# Patient Record
Sex: FEMALE | Race: OTHER | HISPANIC OR LATINO | Employment: OTHER | URBAN - METROPOLITAN AREA
[De-identification: names, ages, dates, MRNs, and addresses within clinical notes are randomized per-mention and may not be internally consistent; named-entity substitution may affect disease eponyms.]

---

## 2017-11-21 ENCOUNTER — OFFICE VISIT (OUTPATIENT)
Dept: URGENT CARE | Facility: CLINIC | Age: 37
End: 2017-11-21
Payer: COMMERCIAL

## 2017-11-21 ENCOUNTER — HOSPITAL ENCOUNTER (OUTPATIENT)
Facility: MEDICAL CENTER | Age: 37
End: 2017-11-21
Attending: PHYSICIAN ASSISTANT
Payer: COMMERCIAL

## 2017-11-21 VITALS
OXYGEN SATURATION: 99 % | WEIGHT: 160 LBS | BODY MASS INDEX: 24.25 KG/M2 | HEART RATE: 78 BPM | RESPIRATION RATE: 16 BRPM | DIASTOLIC BLOOD PRESSURE: 72 MMHG | HEIGHT: 68 IN | SYSTOLIC BLOOD PRESSURE: 118 MMHG | TEMPERATURE: 97.7 F

## 2017-11-21 DIAGNOSIS — N76.0 ACUTE VAGINITIS: ICD-10-CM

## 2017-11-21 DIAGNOSIS — N76.0 ACUTE VAGINITIS: Primary | ICD-10-CM

## 2017-11-21 PROCEDURE — 87660 TRICHOMONAS VAGIN DIR PROBE: CPT

## 2017-11-21 PROCEDURE — 99214 OFFICE O/P EST MOD 30 MIN: CPT | Performed by: PHYSICIAN ASSISTANT

## 2017-11-21 PROCEDURE — 87510 GARDNER VAG DNA DIR PROBE: CPT

## 2017-11-21 PROCEDURE — 87480 CANDIDA DNA DIR PROBE: CPT

## 2017-11-21 RX ORDER — FLUCONAZOLE 150 MG/1
150 TABLET ORAL ONCE
Qty: 1 TAB | Refills: 0 | Status: SHIPPED | OUTPATIENT
Start: 2017-11-21 | End: 2017-11-21

## 2017-11-21 RX ORDER — METRONIDAZOLE 500 MG/1
500 TABLET ORAL EVERY 8 HOURS
Qty: 30 TAB | Refills: 0 | Status: SHIPPED | OUTPATIENT
Start: 2017-11-21 | End: 2017-12-01

## 2017-11-21 NOTE — PATIENT INSTRUCTIONS
Vaginitis  Vaginitis is an inflammation of the vagina. It is most often caused by a change in the normal balance of the bacteria and yeast that live in the vagina. This change in balance causes an overgrowth of certain bacteria or yeast, which causes the inflammation. There are different types of vaginitis, but the most common types are:  · Bacterial vaginosis.  · Yeast infection (candidiasis).  · Trichomoniasis vaginitis. This is a sexually transmitted infection (STI).  · Viral vaginitis.  · Atrophic vaginitis.  · Allergic vaginitis.  CAUSES   The cause depends on the type of vaginitis. Vaginitis can be caused by:  · Bacteria (bacterial vaginosis).  · Yeast (yeast infection).  · A parasite (trichomoniasis vaginitis)  · A virus (viral vaginitis).  · Low hormone levels (atrophic vaginitis). Low hormone levels can occur during pregnancy, breastfeeding, or after menopause.  · Irritants, such as bubble baths, scented tampons, and feminine sprays (allergic vaginitis).  Other factors can change the normal balance of the yeast and bacteria that live in the vagina. These include:  · Antibiotic medicines.  · Poor hygiene.  · Diaphragms, vaginal sponges, spermicides, birth control pills, and intrauterine devices (IUD).  · Sexual intercourse.  · Infection.  · Uncontrolled diabetes.  · A weakened immune system.  SYMPTOMS   Symptoms can vary depending on the cause of the vaginitis. Common symptoms include:  · Abnormal vaginal discharge.  ¨ The discharge is white, gray, or yellow with bacterial vaginosis.  ¨ The discharge is thick, white, and cheesy with a yeast infection.  ¨ The discharge is frothy and yellow or greenish with trichomoniasis.  · A bad vaginal odor.  ¨ The odor is fishy with bacterial vaginosis.  · Vaginal itching, pain, or swelling.  · Painful intercourse.  · Pain or burning when urinating.  Sometimes, there are no symptoms.  TREATMENT   Treatment will vary depending on the type of infection.   · Bacterial  vaginosis and trichomoniasis are often treated with antibiotic creams or pills.  · Yeast infections are often treated with antifungal medicines, such as vaginal creams or suppositories.  · Viral vaginitis has no cure, but symptoms can be treated with medicines that relieve discomfort. Your sexual partner should be treated as well.  · Atrophic vaginitis may be treated with an estrogen cream, pill, suppository, or vaginal ring. If vaginal dryness occurs, lubricants and moisturizing creams may help. You may be told to avoid scented soaps, sprays, or douches.  · Allergic vaginitis treatment involves quitting the use of the product that is causing the problem. Vaginal creams can be used to treat the symptoms.  HOME CARE INSTRUCTIONS   · Take all medicines as directed by your caregiver.  · Keep your genital area clean and dry. Avoid soap and only rinse the area with water.  · Avoid douching. It can remove the healthy bacteria in the vagina.  · Do not use tampons or have sexual intercourse until your vaginitis has been treated. Use sanitary pads while you have vaginitis.  · Wipe from front to back. This avoids the spread of bacteria from the rectum to the vagina.  · Let air reach your genital area.  ¨ Wear cotton underwear to decrease moisture buildup.  ¨ Avoid wearing underwear while you sleep until your vaginitis is gone.  ¨ Avoid tight pants and underwear or nylons without a cotton panel.  ¨ Take off wet clothing (especially bathing suits) as soon as possible.  · Use mild, non-scented products. Avoid using irritants, such as:  ¨ Scented feminine sprays.  ¨ Fabric softeners.  ¨ Scented detergents.  ¨ Scented tampons.  ¨ Scented soaps or bubble baths.  · Practice safe sex and use condoms. Condoms may prevent the spread of trichomoniasis and viral vaginitis.  SEEK MEDICAL CARE IF:   · You have abdominal pain.  · You have a fever or persistent symptoms for more than 2-3 days.  · You have a fever and your symptoms suddenly  get worse.     This information is not intended to replace advice given to you by your health care provider. Make sure you discuss any questions you have with your health care provider.     Document Released: 10/14/2008 Document Revised: 05/03/2016 Document Reviewed: 05/30/2013  [employer] Interactive Patient Education ©2016 [employer]

## 2017-11-21 NOTE — PROGRESS NOTES
Subjective:      Pt is a 37 y.o. female who presents with Vaginal Discharge            HPI  Pt notes 2-3 days of vaginal discharge, foul odor and vaginitis. Pt has not taken any Rx medications for this condition. Pt notes she is leaving town and would like prescriptions written for her to take. Pt states the pain is a 3/10, aching in nature and worse at night. Pt denies CP, SOB, NVD, paresthesias, headaches, dizziness, change in vision, hives, or other joint pain. The pt's medication list, problem list, and allergies have been evaluated and reviewed during today's visit.    PMH:  Negative per pt.      PSH:  Past Surgical History:   Procedure Laterality Date   • LIPOSUCTION     • MAMMOPLASTY AUGMENTATION     • NASAL RECONSTRUCTION         Fam Hx:    family history includes Diabetes in her father; Hyperlipidemia in her father and mother; Hypertension in her father and mother.  Family Status   Relation Status   • Mother Alive   • Father        Soc HX:  Social History     Social History   • Marital status:      Spouse name: N/A   • Number of children: N/A   • Years of education: N/A     Occupational History   • Not on file.     Social History Main Topics   • Smoking status: Never Smoker   • Smokeless tobacco: Never Used   • Alcohol use 0.5 oz/week     1 Glasses of wine per week   • Drug use: No   • Sexual activity: Yes     Partners: Male     Other Topics Concern   • Not on file     Social History Narrative   • No narrative on file         Medications:    Current Outpatient Prescriptions:   •  fluconazole (DIFLUCAN) 150 MG tablet, Take 1 Tab by mouth Once for 1 dose., Disp: 1 Tab, Rfl: 0  •  metronidazole (FLAGYL) 500 MG Tab, Take 1 Tab by mouth every 8 hours for 10 days., Disp: 30 Tab, Rfl: 0      Allergies:  Patient has no known allergies.    ROS  Constitutional: Negative for fever, chills and malaise/fatigue.   HENT: Negative for congestion and sore throat.    Eyes: Negative for blurred vision, double  "vision and photophobia.   Respiratory: Negative for cough and shortness of breath.    Cardiovascular: Negative for chest pain and palpitations.   Gastrointestinal: Negative for heartburn, nausea, vomiting, abdominal pain, diarrhea and constipation.   Genitourinary: Negative for dysuria and flank pain. +vaginitis  Musculoskeletal: Negative for joint pain and myalgias.   Skin: Negative for itching and rash.   Neurological: Negative for dizziness, tingling and headaches.   Endo/Heme/Allergies: Does not bruise/bleed easily.   Psychiatric/Behavioral: Negative for depression. The patient is not nervous/anxious.           Objective:     /72   Pulse 78   Temp 36.5 °C (97.7 °F)   Resp 16   Ht 1.727 m (5' 8\")   Wt 72.6 kg (160 lb)   SpO2 99%   BMI 24.33 kg/m²      Physical Exam   Abdominal: Hernia confirmed negative in the right inguinal area and confirmed negative in the left inguinal area.   Genitourinary: Rectum normal.       Pelvic exam was performed with patient supine. No labial fusion. There is tenderness on the right labia. There is no rash, lesion or injury on the right labia. There is tenderness on the left labia. There is no rash, lesion or injury on the left labia. There is erythema and tenderness in the vagina. No bleeding in the vagina. No foreign body in the vagina. No signs of injury around the vagina. Vaginal discharge found.   Lymphadenopathy:        Right: No inguinal adenopathy present.        Left: No inguinal adenopathy present.          Constitutional: PT is oriented to person, place, and time. PT appears well-developed and well-nourished. No distress.   HENT:   Head: Normocephalic and atraumatic.   Mouth/Throat: Oropharynx is clear and moist. No oropharyngeal exudate.   Eyes: Conjunctivae normal and EOM are normal. Pupils are equal, round, and reactive to light.   Neck: Normal range of motion. Neck supple. No thyromegaly present.   Cardiovascular: Normal rate, regular rhythm, normal heart " sounds and intact distal pulses.  Exam reveals no gallop and no friction rub.    No murmur heard.  Pulmonary/Chest: Effort normal and breath sounds normal. No respiratory distress. PT has no wheezes. PT has no rales. Pt exhibits no tenderness.   Abdominal: Soft. Bowel sounds are normal. PT exhibits no distension and no mass. There is no tenderness. There is no rebound and no guarding.   Musculoskeletal: Normal range of motion. PT exhibits no edema and no tenderness.   Neurological: PT is alert and oriented to person, place, and time. PT has normal reflexes. No cranial nerve deficit.   Skin: Skin is warm and dry. No rash noted. PT is not diaphoretic. No erythema.       Psychiatric: PT has a normal mood and affect. PT behavior is normal. Judgment and thought content normal.        Assessment/Plan:     1. Acute vaginitis  - fluconazole (DIFLUCAN) 150 MG tablet; Take 1 Tab by mouth Once for 1 dose.  Dispense: 1 Tab; Refill: 0  - metronidazole (FLAGYL) 500 MG Tab; Take 1 Tab by mouth every 8 hours for 10 days.  Dispense: 30 Tab; Refill: 0  - VAGINAL PATHOGENS DNA PANEL; Future    Rest, fluids encouraged.  AVS with medical info given.  Pt was in full understanding and agreement with the plan.  Follow-up as needed if symptoms worsen or fail to improve.

## 2017-11-22 LAB
CANDIDA DNA VAG QL PROBE+SIG AMP: NEGATIVE
G VAGINALIS DNA VAG QL PROBE+SIG AMP: POSITIVE
T VAGINALIS DNA VAG QL PROBE+SIG AMP: NEGATIVE

## 2017-11-28 ENCOUNTER — TELEPHONE (OUTPATIENT)
Dept: URGENT CARE | Facility: CLINIC | Age: 37
End: 2017-11-28

## 2017-11-28 DIAGNOSIS — N76.0 ACUTE VAGINITIS: ICD-10-CM

## 2017-11-28 DIAGNOSIS — B96.89 BV (BACTERIAL VAGINOSIS): ICD-10-CM

## 2017-11-28 DIAGNOSIS — N76.0 BV (BACTERIAL VAGINOSIS): ICD-10-CM

## 2017-11-28 RX ORDER — METRONIDAZOLE 500 MG/1
500 TABLET ORAL 2 TIMES DAILY
Qty: 14 TAB | Refills: 0 | Status: SHIPPED | OUTPATIENT
Start: 2017-11-28 | End: 2017-12-05

## 2017-11-28 NOTE — TELEPHONE ENCOUNTER
"Tried to call pt, but no answer and VM box \"not set up yet\".   Gardnerella was pos with vaginal pathogens.   She was treated with flagyl at the time of our visit.  Augusto eRdd PA-C  "

## 2017-11-29 NOTE — TELEPHONE ENCOUNTER
Patient here asking about test results- positive for BV. She lost her printed RX for Flagyl. She is requesting a new Rx.

## 2018-02-12 ENCOUNTER — OFFICE VISIT (OUTPATIENT)
Dept: INTERNAL MEDICINE | Facility: MEDICAL CENTER | Age: 38
End: 2018-02-12
Payer: COMMERCIAL

## 2018-02-12 VITALS
TEMPERATURE: 99 F | DIASTOLIC BLOOD PRESSURE: 57 MMHG | HEART RATE: 92 BPM | WEIGHT: 185 LBS | OXYGEN SATURATION: 97 % | BODY MASS INDEX: 28.04 KG/M2 | HEIGHT: 68 IN | SYSTOLIC BLOOD PRESSURE: 106 MMHG

## 2018-02-12 DIAGNOSIS — I44.0 FIRST DEGREE AV BLOCK: ICD-10-CM

## 2018-02-12 DIAGNOSIS — Z13.220 SCREENING FOR LIPID DISORDERS: ICD-10-CM

## 2018-02-12 DIAGNOSIS — Z76.89 ENCOUNTER TO ESTABLISH CARE: ICD-10-CM

## 2018-02-12 DIAGNOSIS — Z00.00 HEALTHCARE MAINTENANCE: ICD-10-CM

## 2018-02-12 PROCEDURE — 99203 OFFICE O/P NEW LOW 30 MIN: CPT | Mod: 25,GC | Performed by: INTERNAL MEDICINE

## 2018-02-12 PROCEDURE — 93000 ELECTROCARDIOGRAM COMPLETE: CPT | Performed by: INTERNAL MEDICINE

## 2018-02-12 ASSESSMENT — PATIENT HEALTH QUESTIONNAIRE - PHQ9: CLINICAL INTERPRETATION OF PHQ2 SCORE: 0

## 2018-02-13 LAB
APTT PPP: 27 SEC (ref 24–33)
INR PPP: 1 (ref 0.8–1.2)
PROTHROMBIN TIME: 10.8 SEC (ref 9.1–12)

## 2018-02-13 NOTE — PROGRESS NOTES
"New Patient to Establish    Reason to establish: New patient to establish    CC: establish care and request a preoperative labs    HPI: 38 y/o F with pertinent negative past medical history. Presents to the clinic to establish care and to request a preoperative labs.  The patient reports that she is planning to go to [place] to have a liposuction surgery. She has a list of the required preoperative list.  The patient denies fever, chills, cough, headache, chest pain, or abdominal pain.        Patient Active Problem List    Diagnosis Date Noted   • First degree AV block 10/21/2011       Past Medical History:   Diagnosis Date   • Heart block AV first degree        No current outpatient prescriptions on file.     No current facility-administered medications for this visit.        Allergies as of 02/12/2018   • (No Known Allergies)       Social History     Social History   • Marital status:      Spouse name: N/A   • Number of children: N/A   • Years of education: N/A     Occupational History   • Not on file.     Social History Main Topics   • Smoking status: Never Smoker   • Smokeless tobacco: Never Used   • Alcohol use 0.5 oz/week     1 Glasses of wine per week   • Drug use: No   • Sexual activity: Yes     Partners: Male     Other Topics Concern   • Not on file     Social History Narrative   • No narrative on file       Family History   Problem Relation Age of Onset   • Hypertension Mother    • Hyperlipidemia Mother    • Hypertension Father    • Diabetes Father    • Hyperlipidemia Father        Past Surgical History:   Procedure Laterality Date   • LIPOSUCTION     • MAMMOPLASTY AUGMENTATION     • NASAL RECONSTRUCTION         ROS: As per HPI. Additional pertinent symptoms as noted below.    All others negative    /57   Pulse 92   Temp 37.2 °C (99 °F)   Ht 1.727 m (5' 8\")   Wt 83.9 kg (185 lb)   SpO2 97%   BMI 28.13 kg/m²     Physical Exam  General:  Alert and oriented, No apparent distress.    Eyes: " Pupils equal and reactive. No scleral icterus.    Throat: Clear no erythema or exudates noted.    Neck: Supple. No lymphadenopathy noted. Thyroid not enlarged.    Lungs: Clear to auscultation and percussion bilaterally.    Cardiovascular: Regular rate and rhythm. No murmurs, rubs or gallops.    Abdomen:  Benign. No rebound or guarding noted.    Extremities: No clubbing, cyanosis, edema.    Skin: Clear. No rash or suspicious skin lesions noted.    Other:     Note: I have reviewed all pertinent labs and diagnostic tests associated with this visit with specific comments listed under the assessment and plan below    Assessment and Plan    1. Healthcare maintenance  Flu vaccine (Up to date, 2017)  Pap smear (Due, referral to women's [facility] with Dr. Gutierrez)  Colonoscopy (not indicated)  Mammography (not indicated)  Pneumonia vaccine (not indicated)  Shingles vaccine (not indicated)      2. Screening for lipid disorders  - Family H/O dyslipidemia (father and mother)  - First degree heart block  Plan  Lipid panel    3. First degree AV block  - Not sure about the cause   - Detected by doing preoperative EKG  - Not taking medications (Ca Channel blocker, BB, Digitalis), no chest pain, no h/o MI, myocarditis.  - Pt is medically stable, vitals are wnl.    4. Encounter to establish care  - Pt moved recently to [place] 4 months ago  - Pt wants to establish with a PCP      Followup: Return in about 6 months (around 8/12/2018) for please make an appointment with women's health, Dr. Gutierrez.    Risk Assessment (discuss potential complications a function of chronic problems): risk assessment has been discussed with the patient.    Complexity (discuss number of co-morbidities): co-morbidites have been discussed with the patient.    Signed by: Ana Frost M.D.

## 2018-02-15 ENCOUNTER — OFFICE VISIT (OUTPATIENT)
Dept: INTERNAL MEDICINE | Facility: MEDICAL CENTER | Age: 38
End: 2018-02-15
Payer: COMMERCIAL

## 2018-02-15 VITALS
DIASTOLIC BLOOD PRESSURE: 71 MMHG | WEIGHT: 187.2 LBS | BODY MASS INDEX: 28.37 KG/M2 | HEART RATE: 75 BPM | SYSTOLIC BLOOD PRESSURE: 106 MMHG | TEMPERATURE: 98.8 F | HEIGHT: 68 IN | OXYGEN SATURATION: 95 %

## 2018-02-15 DIAGNOSIS — Z09 FOLLOW UP: ICD-10-CM

## 2018-02-15 DIAGNOSIS — I44.0 FIRST DEGREE AV BLOCK: ICD-10-CM

## 2018-02-15 PROCEDURE — 99213 OFFICE O/P EST LOW 20 MIN: CPT | Mod: GE | Performed by: INTERNAL MEDICINE

## 2018-02-15 NOTE — PATIENT INSTRUCTIONS
First-Degree Atrioventricular Block  First-degree atrioventricular (AV) block is a type of heart block.  About Heart Block  Heart block is a problem with the system that controls how often the heart beats (heart rate). If you have heart block, the electrical signals that regulate your heart rate are slowed or interrupted.  Normal Heart Action  The heart has two upper chambers (atria) and two lower chambers (ventricles). They work together to pump blood to the body. The heartbeat starts in an upper area of the right atrium (sinoatrial node, or SA node). This is the heart's natural pacemaker. The SA node sends electrical signals that pass through another node (atrioventricular node, or AV node), which is located between the atria and ventricles. Next, the signals travel through the ventricles on conduction pathways. As the signals pass down these pathways, the ventricles contract and send blood out to the body.  About First-Degree AV Block  First-degree heart block is the least serious type of AV block. If you have first-degree AV block, the signals travel more slowly through your heart. This can cause your heart to beat more slowly than normal, but your heart does not miss any beats. Treatment is usually not needed, but the condition may increase your risk of developing an irregular heart rhythm (atrial fibrillation).  CAUSES   In some cases, a person is born with heart block. More often, the condition develops over time. First-degree heart block may be caused by:  · Any condition that damages the heart's conducting system.  · Overstimulation of a nerve that slows down the heart (vagus nerve). This is common in well-conditioned athletes.  · Some medicines that slow down the heart rate.  RISK FACTORS  The risk for this condition increases with age. It is also more likely to develop in people who have any of the following conditions:  · A heart attack in the past.  · Heart failure.  · Coronary heart  disease.  · Inflammation of heart muscle (myocarditis).  · Disease of heart muscle (cardiomyopathy).  · Infection of the heart valves (endocarditis).  · Infections or diseases that affect the heart. These include:  ¨ Lyme disease.  ¨ Sarcoidosis.  ¨ Hemochromatosis.  ¨ Rheumatic fever.  SYMPTOMS  This condition usually does not cause any symptoms.  DIAGNOSIS  This condition may be diagnosed during a routine physical exam. Your health care provider may suspect a heart block if you have a slow pulse or heartbeat. You may have tests to confirm the diagnosis and to rule out other conditions. These tests may include:  · An electrocardiogram (ECG) to check for problems with the electrical activity in your heart.  · Wearing a portable ECG device for a few days (Holter monitor) or a few weeks (event monitor). This is done to monitor your heart rate over time.  TREATMENT  Usually, treatment is not needed for this condition. You may get treatment for another condition that is causing heart block. You may also need to change or stop taking any heart medicines that could cause heart block.  HOME CARE INSTRUCTIONS  · Take over-the-counter and prescription medicines only as told by your health care provider.  · Work with your health care providers to control all of your risks for heart disease. This may include following these instructions:  ¨ Get regular exercise. Ask your health care provider what type of exercise is safe for you.  ¨ Eat a heart-healthy diet. Your health care provider or dietitian can help you make healthy choices.  ¨ Maintain a healthy weight.  ¨ Do not use any tobacco products, including cigarettes, chewing tobacco, or e-cigarettes. If you need help quitting, ask your health care provider.  ¨ Limit alcohol intake to no more than 1 drink per day for nonpregnant women and 2 drinks per day for men. One drink equals 12 oz of beer, 5 oz of wine, or 1½ oz of hard liquor.  · Keep all follow-up visits as told by your  health care provider. This is important.  SEEK MEDICAL CARE IF:  · You feel as though your heart is skipping beats.  · You feel more tired than normal.  SEEK IMMEDIATE MEDICAL CARE IF:  · You have chest pain, especially if the pain:  ¨ Feels like crushing or pressure.  ¨ Spreads to your arms, back, neck, or jaw.  · You feel short of breath.  · You feel light-headed or weak.  · You faint.     This information is not intended to replace advice given to you by your health care provider. Make sure you discuss any questions you have with your health care provider.     Document Released: 11/30/2009 Document Revised: 05/03/2016 Document Reviewed: 11/18/2015  [employer] Interactive Patient Education ©2016 [employer]

## 2018-02-15 NOTE — PROGRESS NOTES
"      Established Patient    Daria presents today with the following:    CC: Follow up and request a medical clearance before undergoing liposuction.    HPI: Ms. Fregoso is a 38 y/o F with PMH of asymptomatic first degree heart block that she was not aware of until her last office visit when we did an EKG as a requirement before undergoing liposuction.  She presents today at the clinic to request a medical clearance before undergoing liposuction.  The patient reports that she sent her laboratory results and a copy of her EKG to the surgeon and the surgeon requested a medical clearance before doing the liposuction as the patient has first degree heart block.  The patient denies chest pain, palpitations, SOB, syncope, lightheadedness, headache, blurred vision, H/O MI/angina, or family H/O CAD/heart blocks.  The patient had a liposuction surgery on 2011 after a documented asymptomatic first degree heart block and the surgery went fine with no complications.    Patient Active Problem List    Diagnosis Date Noted   • First degree AV block 10/21/2011       No current outpatient prescriptions on file.     No current facility-administered medications for this visit.        ROS: As per HPI. Additional pertinent symptoms as noted below.    All others negative    /71   Pulse 75   Temp 37.1 °C (98.8 °F)   Ht 1.727 m (5' 8\")   Wt 84.9 kg (187 lb 3.2 oz)   SpO2 95%   BMI 28.46 kg/m²     Physical Exam   Constitutional:  oriented to person, place, and time. No distress.   Eyes: Pupils are equal, round, and reactive to light. No scleral icterus.  Neck: Neck supple. No thyromegaly present.   Cardiovascular: Normal rate, regular rhythm and normal heart sounds.  Exam reveals no gallop and no friction rub.  No murmur heard.  Pulmonary/Chest: Breath sounds normal. Chest wall is not dull to percussion.   Musculoskeletal:   no edema.   Lymphadenopathy: no cervical adenopathy  Neurological: alert and oriented to person, place, " and time.   Skin: No cyanosis. Nails show no clubbing.  Other:     Note: I have reviewed all pertinent labs and diagnostic tests associated with this visit with specific comments listed under the assessment and plan below    Assessment and Plan    1. First degree AV block  - no clear etiology, probably congenital vs acquired (less likely)  - Asymptomatic, denies chest pain, palpitations, SOB, syncope, lightheadedness, headache, blurred vision, H/O MI/angina, or family H/O CAD/heart blocks.  Plan  - provide the patient with medical clearance paper to send to the surgeon  - provide the patient with a copy of the EKG to keep with her for future reference.  - assure the patient about the good prognosis of first degree heart block  - provide the patient with a pamphlet about first degree heart block so she can get more information about this condition and be able to monitor herself for any alarming signs.    2. Follow up  - The patient is currently asymptomatic in stable medical condition  - The patient reports that she is feeling healthy and ready to do liposuction.  - Pt will do liposuction at [facility] as she will have the chance to see her family.      Followup: Return if symptoms worsen or fail to improve.      Signed by: Ana Frost M.D.

## 2018-02-15 NOTE — LETTER
February 15, 2018    To whom it may concern        Regarding Daria Fregoso,      The patient has an EKG of first degree heart block that should not increase her risk for any heart complications, she is currently asymptomatic and should be low risk  For upcoming surgery                                Ana Frost

## 2018-03-19 ENCOUNTER — APPOINTMENT (OUTPATIENT)
Dept: INTERNAL MEDICINE | Facility: MEDICAL CENTER | Age: 38
End: 2018-03-19
Payer: COMMERCIAL

## 2018-04-02 ENCOUNTER — OFFICE VISIT (OUTPATIENT)
Dept: INTERNAL MEDICINE | Facility: MEDICAL CENTER | Age: 38
End: 2018-04-02
Payer: COMMERCIAL

## 2018-04-02 VITALS
HEIGHT: 68 IN | HEART RATE: 79 BPM | DIASTOLIC BLOOD PRESSURE: 70 MMHG | WEIGHT: 179.25 LBS | BODY MASS INDEX: 27.17 KG/M2 | TEMPERATURE: 97.9 F | SYSTOLIC BLOOD PRESSURE: 108 MMHG | OXYGEN SATURATION: 98 %

## 2018-04-02 DIAGNOSIS — I44.0 FIRST DEGREE AV BLOCK: ICD-10-CM

## 2018-04-02 DIAGNOSIS — Z01.419 PAP SMEAR, LOW-RISK: ICD-10-CM

## 2018-04-02 DIAGNOSIS — Z12.4 ROUTINE CERVICAL SMEAR: ICD-10-CM

## 2018-04-02 PROCEDURE — 99000 SPECIMEN HANDLING OFFICE-LAB: CPT | Performed by: INTERNAL MEDICINE

## 2018-04-02 PROCEDURE — 99214 OFFICE O/P EST MOD 30 MIN: CPT | Performed by: INTERNAL MEDICINE

## 2018-04-02 NOTE — PROGRESS NOTES
"      Established Patient    Daria presents today with the following:    CC:   Chief Complaint   Patient presents with   • Gynecologic Exam       HPI:    37 yr old female patient with PMHx of First degree AV block presented to the clinic for routine pelvic/breast examination and pap smear.     Previous pelvic exams have all been normal.  Patient denies any current symptoms: No dyspareunia, no discharge, no lesions.  Patient denies family history of GYN or breast cancers.   Patient has had two previous pregnancies  Ab 2   Patient's menstrual history was unremarkable.  Patient is not in need of contraception at this time.  Patient denies any STD risks or concerns.   Patient denies any history of sexual abuse.   Patient denies breast masses or lesions .  Patient Has had two Mammograms in the past and was normal with no evidence of malignancy.    Patient Active Problem List    Diagnosis Date Noted   • First degree AV block 10/21/2011       No current outpatient prescriptions on file.     No current facility-administered medications for this visit.        ROS: As per HPI. Additional pertinent symptoms as noted below.    All others negative    /70   Pulse 79   Temp 36.6 °C (97.9 °F)   Ht 1.727 m (5' 8\")   Wt 81.3 kg (179 lb 4 oz)   LMP 2018   SpO2 98%   BMI 27.25 kg/m²      Physical Exam   General:   No distress.  Normal appearing.  HEENT: Pupils are equal.  Conjunctiva is normal.  Head is normal appearing.  Ears, canals and tympanic membranes are normal.  Oral cavity is pink and moist without lesion.  Neck: Supple without JVD or bruit.  Thyroid is not enlarged.  Pulmonary: Clear to auscultation, with good breath sounds.  Cardiovascular regular rate and rhythm.  No murmur auscultated. No carotid bruits.  Abdomen: Soft, nontender, nondistended.  Normal bowel sounds.  Organs are not enlarged.  Neurologic: Cranial nerves intact.  Strength and sensation are normal.  Skin: No obvious lesions.  Lymph: No " cervical, supraclavicular, axillary nodes noted.  Genitourinary: External genitalia are normal in appearance.                          Speculum exam- Genitourinary: External genitalia are normal in appearance.                                                   Vagina: Rugated, no odor.                               Cervix:  no motion tenderness, patent os without discharge, no lesions.                               Uterus: Small, mobile, midline,non-tender.                               Adnexae: No masses or tenderness bilaterally                               Pap collected.  Breast: Bilateral breasts are normal in appearance. Nipple and areola are normal in appearance. No abnormal skin texture. No masses palpated.      Note: I have reviewed all pertinent labs and diagnostic tests associated with this visit with specific comments listed under the assessment and plan below    Assessment and Plan     1. Routine gynecological examination  - patient here for routine GYN examination with prior examinations normal with no significant family history.  - normal examination of breast and pelvis  - will follow up on pap smear results.     2. Screening for cervical cancer  -Previous Pap smear was four years ago was normal.  - pelvic examination normal with pap collected, sent to lab  - will contact patient with results and send to PCP  - Discussed signs and symptoms of gynecologic cancers. Discussed current guidelines which say Pap smears and pelvic exams can be stopped at age 65. But that does not mean a cancer cannot develop after that age. And if there are any signs of a sore that doesn't heal, spotting or bleeding, or pelvic pain and bloating that is not transient, it needs to be evaluated.     3. Visit for preventive health examination  - patient is here for routine pelvic/pap and breast examination as part of preventive/wellness women's health  - Of note, her prior history and examination been normal.  - normal physical  examination today and will follow up pap smear results      Followup: Return if symptoms worsen or fail to improve, for with PCP.      Signed by: FRANKLYN Romero

## 2018-04-06 LAB
CYTOLOGIST CVX/VAG CYTO: NORMAL
DX ICD CODE: NORMAL
HPV I/H RISK 1 DNA CVX QL PROBE+SIG AMP: NEGATIVE
NOTE  190109: NORMAL
OTHER STN SPEC: NORMAL
PATH REPORT.FINAL DX SPEC: NORMAL
STAT OF ADQ CVX/VAG CYTO-IMP: NORMAL

## 2019-09-11 ENCOUNTER — OFFICE VISIT (OUTPATIENT)
Dept: URGENT CARE | Facility: CLINIC | Age: 39
End: 2019-09-11
Payer: COMMERCIAL

## 2019-09-11 ENCOUNTER — HOSPITAL ENCOUNTER (OUTPATIENT)
Facility: MEDICAL CENTER | Age: 39
End: 2019-09-11
Attending: PHYSICIAN ASSISTANT
Payer: COMMERCIAL

## 2019-09-11 ENCOUNTER — HOSPITAL ENCOUNTER (OUTPATIENT)
Dept: LAB | Facility: MEDICAL CENTER | Age: 39
End: 2019-09-11
Attending: PHYSICIAN ASSISTANT
Payer: COMMERCIAL

## 2019-09-11 VITALS
RESPIRATION RATE: 16 BRPM | BODY MASS INDEX: 29.61 KG/M2 | WEIGHT: 195.4 LBS | DIASTOLIC BLOOD PRESSURE: 82 MMHG | HEIGHT: 68 IN | TEMPERATURE: 97.5 F | SYSTOLIC BLOOD PRESSURE: 102 MMHG | HEART RATE: 65 BPM | OXYGEN SATURATION: 97 %

## 2019-09-11 DIAGNOSIS — N89.8 VAGINAL DISCHARGE: ICD-10-CM

## 2019-09-11 DIAGNOSIS — Z20.2 POSSIBLE EXPOSURE TO STD: ICD-10-CM

## 2019-09-11 LAB
APPEARANCE UR: CLEAR
BILIRUB UR STRIP-MCNC: NEGATIVE MG/DL
COLOR UR AUTO: YELLOW
GLUCOSE UR STRIP.AUTO-MCNC: NEGATIVE MG/DL
INT CON NEG: NORMAL
INT CON POS: NORMAL
KETONES UR STRIP.AUTO-MCNC: NEGATIVE MG/DL
LEUKOCYTE ESTERASE UR QL STRIP.AUTO: NEGATIVE
NITRITE UR QL STRIP.AUTO: NEGATIVE
PH UR STRIP.AUTO: 6 [PH] (ref 5–8)
POC URINE PREGNANCY TEST: NEGATIVE
PROT UR QL STRIP: NEGATIVE MG/DL
RBC UR QL AUTO: NORMAL
SP GR UR STRIP.AUTO: 1.01
TREPONEMA PALLIDUM IGG+IGM AB [PRESENCE] IN SERUM OR PLASMA BY IMMUNOASSAY: NON REACTIVE
UROBILINOGEN UR STRIP-MCNC: 0.2 MG/DL

## 2019-09-11 PROCEDURE — 87510 GARDNER VAG DNA DIR PROBE: CPT

## 2019-09-11 PROCEDURE — 36415 COLL VENOUS BLD VENIPUNCTURE: CPT

## 2019-09-11 PROCEDURE — 87491 CHLMYD TRACH DNA AMP PROBE: CPT

## 2019-09-11 PROCEDURE — 87591 N.GONORRHOEAE DNA AMP PROB: CPT

## 2019-09-11 PROCEDURE — 86780 TREPONEMA PALLIDUM: CPT

## 2019-09-11 PROCEDURE — 87480 CANDIDA DNA DIR PROBE: CPT

## 2019-09-11 PROCEDURE — 81025 URINE PREGNANCY TEST: CPT | Performed by: PHYSICIAN ASSISTANT

## 2019-09-11 PROCEDURE — 81002 URINALYSIS NONAUTO W/O SCOPE: CPT | Performed by: PHYSICIAN ASSISTANT

## 2019-09-11 PROCEDURE — 87660 TRICHOMONAS VAGIN DIR PROBE: CPT

## 2019-09-11 PROCEDURE — 99214 OFFICE O/P EST MOD 30 MIN: CPT | Performed by: PHYSICIAN ASSISTANT

## 2019-09-11 RX ORDER — FLUCONAZOLE 150 MG/1
TABLET ORAL
Qty: 2 TAB | Refills: 1 | Status: SHIPPED | OUTPATIENT
Start: 2019-09-11 | End: 2020-03-16

## 2019-09-12 LAB
C TRACH DNA SPEC QL NAA+PROBE: NEGATIVE
CANDIDA DNA VAG QL PROBE+SIG AMP: POSITIVE
G VAGINALIS DNA VAG QL PROBE+SIG AMP: NEGATIVE
N GONORRHOEA DNA SPEC QL NAA+PROBE: NEGATIVE
SPECIMEN SOURCE: NORMAL
T VAGINALIS DNA VAG QL PROBE+SIG AMP: NEGATIVE

## 2019-09-14 NOTE — PROGRESS NOTES
Chief Complaint   Patient presents with   • Vaginitis     no discharged, burning itchy, requesting for blood work and pelvic exam x 5 days       HISTORY OF PRESENT ILLNESS: Patient is a 38 y.o. female who presents today for about 5 days of vaginal discomfort. She states she has burning and itching sensation but does not describe overt discharge.  She has a slight bit of cramping but has resolved otherwise no abdominal pain. No fevers, chills.  She has been having increased urinary frequency however she feels this has been due to her increasing her fluid intake.  2 weeks ago she had a sexual encounter however condom broke and she is concerned to get tested for STD.  No vaginal sores or lesions.  No recent abx.     Patient Active Problem List    Diagnosis Date Noted   • First degree AV block 10/21/2011       Allergies:Patient has no known allergies.    Current Outpatient Medications Ordered in Epic   Medication Sig Dispense Refill   • fluconazole (DIFLUCAN) 150 MG tablet 1 tablet today.  Repeat in 72 hours 2 Tab 1     No current Epic-ordered facility-administered medications on file.        Past Medical History:   Diagnosis Date   • Heart block AV first degree        Social History     Tobacco Use   • Smoking status: Never Smoker   • Smokeless tobacco: Never Used   Substance Use Topics   • Alcohol use: Yes     Alcohol/week: 0.5 oz     Types: 1 Glasses of wine per week   • Drug use: No       Family Status   Relation Name Status   • Mo  Alive   • Fa       Family History   Problem Relation Age of Onset   • Hypertension Mother    • Hyperlipidemia Mother    • Hypertension Father    • Diabetes Father    • Hyperlipidemia Father        ROS:  Review of Systems   Constitutional: Negative for fever, chills, weight loss and malaise/fatigue.   HENT: Negative for ear pain, nosebleeds, congestion, sore throat and neck pain.    Eyes: Negative for blurred vision.   Respiratory: Negative for cough, sputum production, shortness  "of breath and wheezing.    Cardiovascular: Negative for chest pain, palpitations, orthopnea and leg swelling.   Gastrointestinal: Negative for heartburn, nausea, vomiting and abdominal pain.   Genitourinary: SEE HPI    Exam:  /82   Pulse 65   Temp 36.4 °C (97.5 °F) (Temporal)   Resp 16   Ht 1.727 m (5' 8\")   Wt 88.6 kg (195 lb 6.4 oz)   SpO2 97%   General:  Well nourished, well developed female in NAD  Eyes: PERRLA, EOM within normal limits, no conjunctival injection, no scleral icterus, visual fields and acuity grossly intact.  Neck: no masses, range of motion within normal limits, no tracheal deviation. No lymphadenopathy  Pulmonary: Normal respiratory effort, no wheezes, crackles, or rhonchi.  Cardiovascular: regular rate and rhythm without murmurs, rubs, or gallops.  Abdomen: Soft, nontender, nondistended. Normal bowel sounds. No hepatosplenomegaly or masses, or hernias. No rebound or guarding.  : Vulva on inspection: No swelling, erythema or lesions.  Speculum exam:  There is moderate amounts of thick, curd like white discharge.  Cervix appears normal and is non-friable.  No vaginal lesions.  Bimanual exam:  No CMT, adnexal/uterine masses or tenderness  Skin: No visible rashes or lesion. Warm, pink, dry.   Extremities: no clubbing, cyanosis, or edema.  Neuro: A&O x 3. Speech normal/clear.  Normal gait.         Assessment/Plan:  1. Vaginal discharge  POCT Urinalysis    POCT Pregnancy    VAGINAL PATHOGENS DNA PANEL    CHLAMYDIA/GC PCR URINE OR SWAB    fluconazole (DIFLUCAN) 150 MG tablet   2. Possible exposure to STD  RPR       -U/A grossly unremarkable, preg negative.   -exam is consistent with significant vulvovaginal candidiasis, swab taken however empiric tx will be initiated   -RPR ran, negative.  Patient will follow up for serology for HIV, HSV if she would like to pursue this with her PCP.      Supportive care, differential diagnoses, and indications for immediate follow-up discussed with " patient.   Pathogenesis of diagnosis discussed including typical length and natural progression.   Instructed to return to clinic or nearest emergency department for any change in condition, further concerns, or worsening of symptoms.  Patient states understanding of the plan of care and discharge instructions.      Kathy Maldonado P.A.-C.

## 2019-10-03 ENCOUNTER — HOSPITAL ENCOUNTER (OUTPATIENT)
Dept: RADIOLOGY | Facility: MEDICAL CENTER | Age: 39
End: 2019-10-03
Attending: STUDENT IN AN ORGANIZED HEALTH CARE EDUCATION/TRAINING PROGRAM
Payer: COMMERCIAL

## 2019-10-03 DIAGNOSIS — Z12.31 SCREENING MAMMOGRAM FOR HIGH-RISK PATIENT: ICD-10-CM

## 2019-10-07 ENCOUNTER — HOSPITAL ENCOUNTER (OUTPATIENT)
Dept: RADIOLOGY | Facility: MEDICAL CENTER | Age: 39
End: 2019-10-07

## 2019-10-07 ENCOUNTER — HOSPITAL ENCOUNTER (OUTPATIENT)
Dept: LAB | Facility: MEDICAL CENTER | Age: 39
End: 2019-10-07
Attending: STUDENT IN AN ORGANIZED HEALTH CARE EDUCATION/TRAINING PROGRAM
Payer: COMMERCIAL

## 2019-10-07 LAB
ALBUMIN SERPL BCP-MCNC: 4.4 G/DL (ref 3.2–4.9)
ALBUMIN/GLOB SERPL: 1.6 G/DL
ALP SERPL-CCNC: 47 U/L (ref 30–99)
ALT SERPL-CCNC: 14 U/L (ref 2–50)
ANION GAP SERPL CALC-SCNC: 6 MMOL/L (ref 0–11.9)
AST SERPL-CCNC: 16 U/L (ref 12–45)
BASOPHILS # BLD AUTO: 1.1 % (ref 0–1.8)
BASOPHILS # BLD: 0.08 K/UL (ref 0–0.12)
BILIRUB SERPL-MCNC: 0.7 MG/DL (ref 0.1–1.5)
BUN SERPL-MCNC: 14 MG/DL (ref 8–22)
CALCIUM SERPL-MCNC: 8.8 MG/DL (ref 8.5–10.5)
CHLORIDE SERPL-SCNC: 105 MMOL/L (ref 96–112)
CHOLEST SERPL-MCNC: 201 MG/DL (ref 100–199)
CO2 SERPL-SCNC: 26 MMOL/L (ref 20–33)
CREAT SERPL-MCNC: 0.65 MG/DL (ref 0.5–1.4)
EOSINOPHIL # BLD AUTO: 0.19 K/UL (ref 0–0.51)
EOSINOPHIL NFR BLD: 2.5 % (ref 0–6.9)
ERYTHROCYTE [DISTWIDTH] IN BLOOD BY AUTOMATED COUNT: 44.6 FL (ref 35.9–50)
GLOBULIN SER CALC-MCNC: 2.7 G/DL (ref 1.9–3.5)
GLUCOSE SERPL-MCNC: 85 MG/DL (ref 65–99)
HCT VFR BLD AUTO: 46.3 % (ref 37–47)
HDLC SERPL-MCNC: 62 MG/DL
HGB BLD-MCNC: 14.7 G/DL (ref 12–16)
IMM GRANULOCYTES # BLD AUTO: 0.02 K/UL (ref 0–0.11)
IMM GRANULOCYTES NFR BLD AUTO: 0.3 % (ref 0–0.9)
INR PPP: 1.03 (ref 0.87–1.13)
LDLC SERPL CALC-MCNC: 122 MG/DL
LYMPHOCYTES # BLD AUTO: 2.99 K/UL (ref 1–4.8)
LYMPHOCYTES NFR BLD: 39.9 % (ref 22–41)
MCH RBC QN AUTO: 29.2 PG (ref 27–33)
MCHC RBC AUTO-ENTMCNC: 31.7 G/DL (ref 33.6–35)
MCV RBC AUTO: 92 FL (ref 81.4–97.8)
MONOCYTES # BLD AUTO: 0.51 K/UL (ref 0–0.85)
MONOCYTES NFR BLD AUTO: 6.8 % (ref 0–13.4)
NEUTROPHILS # BLD AUTO: 3.71 K/UL (ref 2–7.15)
NEUTROPHILS NFR BLD: 49.4 % (ref 44–72)
NRBC # BLD AUTO: 0.02 K/UL
NRBC BLD-RTO: 0.3 /100 WBC
PLATELET # BLD AUTO: 330 K/UL (ref 164–446)
PMV BLD AUTO: 10.9 FL (ref 9–12.9)
POTASSIUM SERPL-SCNC: 4.3 MMOL/L (ref 3.6–5.5)
PROT SERPL-MCNC: 7.1 G/DL (ref 6–8.2)
PROTHROMBIN TIME: 13.8 SEC (ref 12–14.6)
RBC # BLD AUTO: 5.03 M/UL (ref 4.2–5.4)
SODIUM SERPL-SCNC: 137 MMOL/L (ref 135–145)
TRIGL SERPL-MCNC: 86 MG/DL (ref 0–149)
WBC # BLD AUTO: 7.5 K/UL (ref 4.8–10.8)

## 2019-10-07 PROCEDURE — 80053 COMPREHEN METABOLIC PANEL: CPT

## 2019-10-07 PROCEDURE — 85025 COMPLETE CBC W/AUTO DIFF WBC: CPT

## 2019-10-07 PROCEDURE — 85610 PROTHROMBIN TIME: CPT

## 2019-10-07 PROCEDURE — 36415 COLL VENOUS BLD VENIPUNCTURE: CPT

## 2019-10-07 PROCEDURE — 80061 LIPID PANEL: CPT

## 2019-10-09 ENCOUNTER — HOSPITAL ENCOUNTER (OUTPATIENT)
Dept: RADIOLOGY | Facility: MEDICAL CENTER | Age: 39
End: 2019-10-09
Attending: STUDENT IN AN ORGANIZED HEALTH CARE EDUCATION/TRAINING PROGRAM
Payer: COMMERCIAL

## 2019-10-09 DIAGNOSIS — N64.4 BREAST PAIN, LEFT: ICD-10-CM

## 2019-10-09 PROCEDURE — 76642 ULTRASOUND BREAST LIMITED: CPT | Mod: LT

## 2019-10-09 PROCEDURE — G0279 TOMOSYNTHESIS, MAMMO: HCPCS

## 2020-03-16 ENCOUNTER — OFFICE VISIT (OUTPATIENT)
Dept: URGENT CARE | Facility: CLINIC | Age: 40
End: 2020-03-16
Payer: COMMERCIAL

## 2020-03-16 ENCOUNTER — HOSPITAL ENCOUNTER (OUTPATIENT)
Facility: MEDICAL CENTER | Age: 40
End: 2020-03-16
Attending: PHYSICIAN ASSISTANT
Payer: COMMERCIAL

## 2020-03-16 VITALS
WEIGHT: 193.2 LBS | BODY MASS INDEX: 29.28 KG/M2 | DIASTOLIC BLOOD PRESSURE: 80 MMHG | TEMPERATURE: 98.4 F | SYSTOLIC BLOOD PRESSURE: 104 MMHG | RESPIRATION RATE: 16 BRPM | HEART RATE: 74 BPM | OXYGEN SATURATION: 95 % | HEIGHT: 68 IN

## 2020-03-16 DIAGNOSIS — N76.0 ACUTE VAGINITIS: Primary | ICD-10-CM

## 2020-03-16 DIAGNOSIS — N76.0 ACUTE VAGINITIS: ICD-10-CM

## 2020-03-16 LAB
APPEARANCE UR: CLEAR
BILIRUB UR STRIP-MCNC: ABNORMAL MG/DL
COLOR UR AUTO: YELLOW
GLUCOSE UR STRIP.AUTO-MCNC: ABNORMAL MG/DL
KETONES UR STRIP.AUTO-MCNC: ABNORMAL MG/DL
LEUKOCYTE ESTERASE UR QL STRIP.AUTO: ABNORMAL
NITRITE UR QL STRIP.AUTO: ABNORMAL
PH UR STRIP.AUTO: 5.5 [PH] (ref 5–8)
PROT UR QL STRIP: ABNORMAL MG/DL
RBC UR QL AUTO: ABNORMAL
SP GR UR STRIP.AUTO: 1.03
UROBILINOGEN UR STRIP-MCNC: 0.2 MG/DL

## 2020-03-16 PROCEDURE — 87491 CHLMYD TRACH DNA AMP PROBE: CPT

## 2020-03-16 PROCEDURE — 87591 N.GONORRHOEAE DNA AMP PROB: CPT

## 2020-03-16 PROCEDURE — 99214 OFFICE O/P EST MOD 30 MIN: CPT | Performed by: PHYSICIAN ASSISTANT

## 2020-03-16 PROCEDURE — 81002 URINALYSIS NONAUTO W/O SCOPE: CPT | Performed by: PHYSICIAN ASSISTANT

## 2020-03-16 RX ORDER — FLUCONAZOLE 150 MG/1
TABLET ORAL
Qty: 3 TAB | Refills: 0 | Status: SHIPPED | OUTPATIENT
Start: 2020-03-16 | End: 2020-03-17 | Stop reason: SDUPTHER

## 2020-03-16 ASSESSMENT — FIBROSIS 4 INDEX: FIB4 SCORE: 0.53

## 2020-03-17 ENCOUNTER — TELEPHONE (OUTPATIENT)
Dept: URGENT CARE | Facility: CLINIC | Age: 40
End: 2020-03-17

## 2020-03-17 PROBLEM — Z01.818 PREOPERATIVE EVALUATION TO RULE OUT SURGICAL CONTRAINDICATION: Status: ACTIVE | Noted: 2019-10-04

## 2020-03-17 PROBLEM — M25.521 ARTHRALGIA OF RIGHT ELBOW: Status: ACTIVE | Noted: 2020-01-31

## 2020-03-17 PROBLEM — E78.5 HYPERLIPIDEMIA, UNSPECIFIED: Status: ACTIVE | Noted: 2020-01-31

## 2020-03-17 PROBLEM — M25.549 ARTHRALGIA OF HAND: Status: ACTIVE | Noted: 2019-10-04

## 2020-03-17 RX ORDER — FLUCONAZOLE 150 MG/1
TABLET ORAL
Qty: 3 TAB | Refills: 0 | Status: SHIPPED | OUTPATIENT
Start: 2020-03-17 | End: 2021-06-02

## 2020-03-17 NOTE — PATIENT INSTRUCTIONS
Vaginitis  Vaginitis is an inflammation of the vagina. It can happen when the normal bacteria and yeast in the vagina grow too much. There are different types. Treatment will depend on the type you have.  Follow these instructions at home:  · Take all medicines as told by your doctor.  · Keep your vagina area clean and dry. Avoid soap. Rinse the area with water.  · Avoid washing and cleaning out the vagina (douching).  · Do not use tampons or have sex (intercourse) until your treatment is done.  · Wipe from front to back after going to the restroom.  · Wear cotton underwear.  · Avoid wearing underwear while you sleep until your vaginitis is gone.  · Avoid tight pants. Avoid underwear or nylons without a cotton panel.  · Take off wet clothing (such as a bathing suit) as soon as you can.  · Use mild, unscented products. Avoid fabric softeners and scented:  ¨ Feminine sprays.  ¨ Laundry detergents.  ¨ Tampons.  ¨ Soaps or bubble baths.  · Practice safe sex and use condoms.  Get help right away if:  · You have belly (abdominal) pain.  · You have a fever or lasting symptoms for more than 2-3 days.  · You have a fever and your symptoms suddenly get worse.  This information is not intended to replace advice given to you by your health care provider. Make sure you discuss any questions you have with your health care provider.  Document Released: 03/16/2010 Document Revised: 05/25/2017 Document Reviewed: 05/30/2013  [employer] Interactive Patient Education © 2017 [employer]

## 2020-03-17 NOTE — TELEPHONE ENCOUNTER
Medhat Worthington the patient called in saying that when she went to the [facility] pharmacy last night they did not have the prescription that you had sent in for her. She states she is now out of town and wanted to know if you could send the medication to the [facility] that I added into her chart. Please and thank you.

## 2020-03-17 NOTE — PROGRESS NOTES
"Subjective:      Pt is a 39 y.o. female who presents with Vaginal Discharge (x 3-4 days white \"oxidized\" discharge, was on Fluconazole-finished yesterday, feels like she has to pee again. pt finished her last period 2020)            HPI  This is a new problem. x 3-4 days white \"oxidized\" discharge, was on Fluconazole-finished yesterday, feels like she has to pee again. pt finished her last period 2020. Pt has not taken any Rx medications for this particular condition. Pt states the pain is a 3/10, aching in nature and worse at night. Pt denies CP, SOB, NVD, paresthesias, headaches, dizziness, change in vision, hives, or other joint pain. The pt's medication list, problem list, and allergies have been evaluated and reviewed during today's visit.    PMH:  Past Medical History:   Diagnosis Date   • Heart block AV first degree        PSH:  Past Surgical History:   Procedure Laterality Date   • LIPOSUCTION     • MAMMOPLASTY AUGMENTATION     • NASAL RECONSTRUCTION         Fam Hx:    family history includes Diabetes in her father; Hyperlipidemia in her father and mother; Hypertension in her father and mother.  Family Status   Relation Name Status   • Mo  Alive   • Fa         Soc HX:  Social History     Socioeconomic History   • Marital status:      Spouse name: Not on file   • Number of children: Not on file   • Years of education: Not on file   • Highest education level: Not on file   Occupational History   • Not on file   Social Needs   • Financial resource strain: Not on file   • Food insecurity     Worry: Not on file     Inability: Not on file   • Transportation needs     Medical: Not on file     Non-medical: Not on file   Tobacco Use   • Smoking status: Never Smoker   • Smokeless tobacco: Never Used   Substance and Sexual Activity   • Alcohol use: Yes     Alcohol/week: 0.5 oz     Types: 1 Glasses of wine per week   • Drug use: No   • Sexual activity: Yes     Partners: Male   Lifestyle   • " "Physical activity     Days per week: Not on file     Minutes per session: Not on file   • Stress: Not on file   Relationships   • Social connections     Talks on phone: Not on file     Gets together: Not on file     Attends Christian service: Not on file     Active member of club or organization: Not on file     Attends meetings of clubs or organizations: Not on file     Relationship status: Not on file   • Intimate partner violence     Fear of current or ex partner: Not on file     Emotionally abused: Not on file     Physically abused: Not on file     Forced sexual activity: Not on file   Other Topics Concern   • Not on file   Social History Narrative   • Not on file         Medications:    Current Outpatient Medications:   •  fluconazole (DIFLUCAN) 150 MG tablet, Take 1 tab PO at onset of symptoms then one tab PO every other day until symptoms resolve., Disp: 3 Tab, Rfl: 0      Allergies:  Patient has no known allergies.    ROS  Constitutional: Negative for fever, chills and malaise/fatigue.   HENT: Negative for congestion and sore throat.    Eyes: Negative for blurred vision, double vision and photophobia.   Respiratory: Negative for cough and shortness of breath.  Cardiovascular: Negative for chest pain and palpitations.   Gastrointestinal: Negative for heartburn, nausea, vomiting, abdominal pain, diarrhea and constipation.   Genitourinary: Negative for dysuria and flank pain. +vaginitis  Musculoskeletal: Negative for joint pain and myalgias.   Skin: Negative for itching and rash.   Neurological: Negative for dizziness, tingling and headaches.   Endo/Heme/Allergies: Does not bruise/bleed easily.   Psychiatric/Behavioral: Negative for depression. The patient is not nervous/anxious.           Objective:     /80 (BP Location: Right arm, Patient Position: Sitting, BP Cuff Size: Adult)   Pulse 74   Temp 36.9 °C (98.4 °F) (Temporal)   Resp 16   Ht 1.727 m (5' 8\")   Wt 87.6 kg (193 lb 3.2 oz)   SpO2 95%   " BMI 29.38 kg/m²      Physical Exam  Physical Exam   Constitutional: She is oriented to person, place, and time. She appears well-developed and well-nourished. No distress.   HENT:   Head: Normocephalic and atraumatic.   Right Ear: External ear normal.   Left Ear: External ear normal.   Nose: Nose normal.   Mouth/Throat: Oropharynx is clear and moist. No oropharyngeal exudate.   Eyes: Conjunctivae normal and EOM are normal. Pupils are equal, round, and reactive to light.   Neck: Normal range of motion. Neck supple. No thyromegaly present.   Cardiovascular: Normal rate, regular rhythm, normal heart sounds and intact distal pulses.  Exam reveals no gallop and no friction rub.    No murmur heard.  Pulmonary/Chest: Effort normal and breath sounds normal. No respiratory distress. She has no wheezes. She has no rales. She exhibits no tenderness.   Abdominal: Soft. Bowel sounds are normal. She exhibits no distension and no mass. There is no tenderness. There is no rebound and no guarding.   Genitourinary:        Pt deferred   Musculoskeletal: Normal range of motion. She exhibits no edema and no tenderness.   Lymphadenopathy:     She has no cervical adenopathy.   Neurological: She is alert and oriented to person, place, and time. She has normal reflexes. No cranial nerve deficit.   Skin: Skin is warm and dry. No rash noted. No erythema.   Psychiatric: She has a normal mood and affect. Her behavior is normal. Judgment and thought content normal.             Assessment/Plan:       1. Acute vaginitis    - VAGINAL PATHOGENS DNA PANEL; Future  - CHLAMYDIA/GC PCR URINE OR SWAB; Future  - POCT Urinalysis-->sm blood  - fluconazole (DIFLUCAN) 150 MG tablet; Take 1 tab PO at onset of symptoms then one tab PO every other day until symptoms resolve.  Dispense: 3 Tab; Refill: 0    Rest, fluids encouraged.  AVS with medical info given.  Pt was in full understanding and agreement with the plan.  Differential diagnosis, natural history,  supportive care, and indications for immediate follow-up discussed. All questions answered. Patient agrees with the plan of care.  Follow-up as needed if symptoms worsen or fail to improve to PCP, Urgent care or Emergency Room.

## 2020-03-17 NOTE — TELEPHONE ENCOUNTER
Medhat Worthington, you saw this patient here in [place] yesterday. The lab just called about her GC/Chlamydia swab that was sent off. They are saying it needs to be re collected due to both of the swabs being in the tube. Do you want me to reach out to the patient for you or would you rather talk to the patient about this.

## 2020-03-18 ENCOUNTER — TELEPHONE (OUTPATIENT)
Dept: URGENT CARE | Facility: CLINIC | Age: 40
End: 2020-03-18

## 2020-03-18 NOTE — TELEPHONE ENCOUNTER
Called and spoke with patient informed her of her rx that was sent to the pharmacy where she is located. Also informed her that there was an error with the specimen containers that were sent to the lab and that we were unable to run the tests. Informed her to take her medication as prescribed and that if symptoms don't resolve within the next few days to go be seen at a local uc. Patient was very grateful for the prescription being re sent, and showed complete understanding of everything I addressed with her.

## 2020-12-11 ENCOUNTER — INITIAL PRENATAL (OUTPATIENT)
Dept: OBGYN | Facility: CLINIC | Age: 40
End: 2020-12-11
Payer: COMMERCIAL

## 2020-12-11 VITALS — DIASTOLIC BLOOD PRESSURE: 91 MMHG | WEIGHT: 204 LBS | BODY MASS INDEX: 31.02 KG/M2 | SYSTOLIC BLOOD PRESSURE: 137 MMHG

## 2020-12-11 DIAGNOSIS — Z32.01 POSITIVE PREGNANCY TEST: ICD-10-CM

## 2020-12-11 DIAGNOSIS — O09.91 HIGH-RISK PREGNANCY IN FIRST TRIMESTER: ICD-10-CM

## 2020-12-11 DIAGNOSIS — F41.9 ANXIETY: ICD-10-CM

## 2020-12-11 DIAGNOSIS — O16.1 ELEVATED BLOOD PRESSURE AFFECTING PREGNANCY IN FIRST TRIMESTER, ANTEPARTUM: ICD-10-CM

## 2020-12-11 DIAGNOSIS — N92.6 MISSED MENSES: ICD-10-CM

## 2020-12-11 PROCEDURE — 99202 OFFICE O/P NEW SF 15 MIN: CPT | Mod: 25 | Performed by: OBSTETRICS & GYNECOLOGY

## 2020-12-11 PROCEDURE — 76830 TRANSVAGINAL US NON-OB: CPT | Performed by: OBSTETRICS & GYNECOLOGY

## 2020-12-11 RX ORDER — NORGESTIMATE AND ETHINYL ESTRADIOL 7DAYSX3 28
KIT ORAL
Status: ON HOLD | COMMUNITY
Start: 2020-11-13 | End: 2021-07-16

## 2020-12-11 RX ORDER — HYDROXYZINE HYDROCHLORIDE 25 MG/1
25 TABLET, FILM COATED ORAL 3 TIMES DAILY PRN
Qty: 30 TAB | Refills: 0 | Status: SHIPPED | OUTPATIENT
Start: 2020-12-11 | End: 2021-06-02

## 2020-12-11 ASSESSMENT — FIBROSIS 4 INDEX: FIB4 SCORE: 0.54

## 2020-12-11 NOTE — PROGRESS NOTES
GYN Visit    CC: missed menses    HPI: 40 y.o.  c/o missed period.  Unsure of exact LMP.  Patient was not trying for pregnancy, feels okay with the idea of pregnancy at this time.  Patient reports increased anxiety recently, wondering if there is when she can take for this.  Denies thoughts of self-harm now or in the past.  Patient reports there has been a lot of increased stress, reports her brother is in snf in [place] with immigration.      ROS:  12 system review of symptoms performed and negative except as above      OB History    Para Term  AB Living   2 0     2 0   SAB TAB Ectopic Molar Multiple Live Births   1 1              # Outcome Date GA Lbr Tristan/2nd Weight Sex Delivery Anes PTL Lv   2 TAB            1 SAB                GYNHx:  Menses q30d  +gonorrhea  Denies abnl paps, last 2-3yrs agor    PMHx: denies      Past Surgical History:   Procedure Laterality Date   • APPENDECTOMY     • LIPOSUCTION     • MAMMOPLASTY AUGMENTATION     • NASAL RECONSTRUCTION       Meds: Prenatal vitamin     Allergies: Patient has no known allergies.    Family History   Problem Relation Age of Onset   • Hypertension Mother    • Hyperlipidemia Mother    • Hypertension Father    • Diabetes Father    • Hyperlipidemia Father      Social History     Tobacco Use   • Smoking status: Never Smoker   • Smokeless tobacco: Never Used   Substance Use Topics   • Alcohol use: Not Currently     Alcohol/week: 0.5 oz     Types: 1 Glasses of wine per week   • Drug use: No       PE:   /91   Wt 92.5 kg (204 lb)   BMI 31.02 kg/m²   gen; AAO, NAD, affect appropriate  Ext: NT, edema  : normal external female genitalia  Skin: dry, intact, no lesions    Transvaginal US performed and per my read:    Indication: Missed menses, unknown LMP, positive pregnancy test    Findings:   hahn intrauterine pregnancy with + gestational sac, +yolk sac  CRL 2.10 cm (8w5d)  Positive fetal cardiac activity, 160s BPM  Right ovary not  visualized. Left Ovary not visualized. Cervical length grossly normal   No free fluid in the cul-de-sac.    Impression: viable single intrauterine pregnancy @8 weeks 5 days. EDC by US of 2021        A/P: 40 y.o.  w/ missed menses, +UPT  Viable IUP confirmed today  Dating: PAPA 2021 by 8-week ultrasound today    - discussed carrier screening, offered CF/SMA/globin apathy carrier screening, patient instructed to ensure insurance coverage prior to lab draw    - discussed increased risk of genetic abnormality given advanced maternal age.  Recommend genetic counseling for full discussion of bloody screening/testing, referral to high risk pregnancy center placed    -Elevated blood pressure today, patient reports her blood pressure is usually low and denies any known history of high blood pressure.  Will get baseline preeclampsia labs, also discussed that we will recommend ASA 81 mg daily to decrease her risk of preeclampsia in this pregnancy, will readdress at next visit    -Referral to behavioral health today.  Atarax for as needed use, patient prefers not to start on daily medication currently, patient denies thoughts of self-harm.    - discussed group model of care/shared hospital call      RTC for NOB visit, NOB labs prior to that visit; early glucola given age/BMI      Herlinda Moore MD  [facility], Women's Health

## 2020-12-14 ENCOUNTER — HOSPITAL ENCOUNTER (OUTPATIENT)
Dept: LAB | Facility: MEDICAL CENTER | Age: 40
End: 2020-12-14
Attending: OBSTETRICS & GYNECOLOGY
Payer: COMMERCIAL

## 2020-12-14 DIAGNOSIS — O09.91 HIGH-RISK PREGNANCY IN FIRST TRIMESTER: ICD-10-CM

## 2020-12-14 DIAGNOSIS — O16.1 ELEVATED BLOOD PRESSURE AFFECTING PREGNANCY IN FIRST TRIMESTER, ANTEPARTUM: ICD-10-CM

## 2020-12-14 LAB
ABO GROUP BLD: NORMAL
ALBUMIN SERPL BCP-MCNC: 4.3 G/DL (ref 3.2–4.9)
ALBUMIN/GLOB SERPL: 1.5 G/DL
ALP SERPL-CCNC: 49 U/L (ref 30–99)
ALT SERPL-CCNC: 15 U/L (ref 2–50)
ANION GAP SERPL CALC-SCNC: 10 MMOL/L (ref 7–16)
APPEARANCE UR: ABNORMAL
AST SERPL-CCNC: 17 U/L (ref 12–45)
BACTERIA #/AREA URNS HPF: ABNORMAL /HPF
BASOPHILS # BLD AUTO: 0.7 % (ref 0–1.8)
BASOPHILS # BLD: 0.07 K/UL (ref 0–0.12)
BILIRUB SERPL-MCNC: 0.5 MG/DL (ref 0.1–1.5)
BILIRUB UR QL STRIP.AUTO: ABNORMAL
BLD GP AB SCN SERPL QL: NORMAL
BUN SERPL-MCNC: 8 MG/DL (ref 8–22)
CALCIUM SERPL-MCNC: 9.8 MG/DL (ref 8.5–10.5)
CHLORIDE SERPL-SCNC: 101 MMOL/L (ref 96–112)
CO2 SERPL-SCNC: 23 MMOL/L (ref 20–33)
COLOR UR: ABNORMAL
CREAT SERPL-MCNC: 0.53 MG/DL (ref 0.5–1.4)
CREAT UR-MCNC: 393.57 MG/DL
EOSINOPHIL # BLD AUTO: 0.13 K/UL (ref 0–0.51)
EOSINOPHIL NFR BLD: 1.4 % (ref 0–6.9)
EPI CELLS #/AREA URNS HPF: ABNORMAL /HPF
ERYTHROCYTE [DISTWIDTH] IN BLOOD BY AUTOMATED COUNT: 41.4 FL (ref 35.9–50)
FASTING STATUS PATIENT QL REPORTED: NORMAL
GLOBULIN SER CALC-MCNC: 2.9 G/DL (ref 1.9–3.5)
GLUCOSE 1H P 50 G GLC PO SERPL-MCNC: 175 MG/DL (ref 70–139)
GLUCOSE SERPL-MCNC: 90 MG/DL (ref 65–99)
GLUCOSE UR STRIP.AUTO-MCNC: NEGATIVE MG/DL
HBV SURFACE AG SER QL: ABNORMAL
HCT VFR BLD AUTO: 48.3 % (ref 37–47)
HGB BLD-MCNC: 16.1 G/DL (ref 12–16)
HIV 1+2 AB+HIV1 P24 AG SERPL QL IA: NORMAL
HYALINE CASTS #/AREA URNS LPF: ABNORMAL /LPF
IMM GRANULOCYTES # BLD AUTO: 0.04 K/UL (ref 0–0.11)
IMM GRANULOCYTES NFR BLD AUTO: 0.4 % (ref 0–0.9)
KETONES UR STRIP.AUTO-MCNC: ABNORMAL MG/DL
LEUKOCYTE ESTERASE UR QL STRIP.AUTO: ABNORMAL
LYMPHOCYTES # BLD AUTO: 2.74 K/UL (ref 1–4.8)
LYMPHOCYTES NFR BLD: 29 % (ref 22–41)
MCH RBC QN AUTO: 29.7 PG (ref 27–33)
MCHC RBC AUTO-ENTMCNC: 33.3 G/DL (ref 33.6–35)
MCV RBC AUTO: 89.1 FL (ref 81.4–97.8)
MICRO URNS: ABNORMAL
MONOCYTES # BLD AUTO: 0.68 K/UL (ref 0–0.85)
MONOCYTES NFR BLD AUTO: 7.2 % (ref 0–13.4)
NEUTROPHILS # BLD AUTO: 5.79 K/UL (ref 2–7.15)
NEUTROPHILS NFR BLD: 61.3 % (ref 44–72)
NITRITE UR QL STRIP.AUTO: NEGATIVE
NRBC # BLD AUTO: 0 K/UL
NRBC BLD-RTO: 0 /100 WBC
PH UR STRIP.AUTO: 7.5 [PH] (ref 5–8)
PLATELET # BLD AUTO: 359 K/UL (ref 164–446)
PMV BLD AUTO: 11.1 FL (ref 9–12.9)
POTASSIUM SERPL-SCNC: 4.4 MMOL/L (ref 3.6–5.5)
PROT SERPL-MCNC: 7.2 G/DL (ref 6–8.2)
PROT UR QL STRIP: 30 MG/DL
PROT UR-MCNC: 21 MG/DL (ref 0–15)
PROT/CREAT UR: 53 MG/G (ref 10–107)
RBC # BLD AUTO: 5.42 M/UL (ref 4.2–5.4)
RBC # URNS HPF: ABNORMAL /HPF
RBC UR QL AUTO: NEGATIVE
RH BLD: NORMAL
RUBV AB SER QL: 86.3 IU/ML
SODIUM SERPL-SCNC: 134 MMOL/L (ref 135–145)
SP GR UR STRIP.AUTO: 1.03
TREPONEMA PALLIDUM IGG+IGM AB [PRESENCE] IN SERUM OR PLASMA BY IMMUNOASSAY: ABNORMAL
UROBILINOGEN UR STRIP.AUTO-MCNC: 0.2 MG/DL
WBC # BLD AUTO: 9.5 K/UL (ref 4.8–10.8)
WBC #/AREA URNS HPF: ABNORMAL /HPF

## 2020-12-14 PROCEDURE — 87086 URINE CULTURE/COLONY COUNT: CPT

## 2020-12-14 PROCEDURE — 36415 COLL VENOUS BLD VENIPUNCTURE: CPT

## 2020-12-14 PROCEDURE — 87389 HIV-1 AG W/HIV-1&-2 AB AG IA: CPT

## 2020-12-14 PROCEDURE — 87340 HEPATITIS B SURFACE AG IA: CPT

## 2020-12-14 PROCEDURE — 84156 ASSAY OF PROTEIN URINE: CPT

## 2020-12-14 PROCEDURE — 82950 GLUCOSE TEST: CPT

## 2020-12-14 PROCEDURE — 85025 COMPLETE CBC W/AUTO DIFF WBC: CPT

## 2020-12-14 PROCEDURE — 86780 TREPONEMA PALLIDUM: CPT

## 2020-12-14 PROCEDURE — 86762 RUBELLA ANTIBODY: CPT

## 2020-12-14 PROCEDURE — 80053 COMPREHEN METABOLIC PANEL: CPT

## 2020-12-14 PROCEDURE — 86901 BLOOD TYPING SEROLOGIC RH(D): CPT

## 2020-12-14 PROCEDURE — 82570 ASSAY OF URINE CREATININE: CPT

## 2020-12-14 PROCEDURE — 86850 RBC ANTIBODY SCREEN: CPT

## 2020-12-14 PROCEDURE — 86900 BLOOD TYPING SEROLOGIC ABO: CPT

## 2020-12-14 PROCEDURE — 81001 URINALYSIS AUTO W/SCOPE: CPT

## 2020-12-16 LAB
BACTERIA UR CULT: NORMAL
SIGNIFICANT IND 70042: NORMAL
SITE SITE: NORMAL
SOURCE SOURCE: NORMAL

## 2020-12-17 DIAGNOSIS — R73.09 ELEVATED GLUCOSE: ICD-10-CM

## 2020-12-23 ENCOUNTER — HOSPITAL ENCOUNTER (OUTPATIENT)
Dept: LAB | Facility: MEDICAL CENTER | Age: 40
End: 2020-12-23
Attending: OBSTETRICS & GYNECOLOGY
Payer: COMMERCIAL

## 2020-12-23 DIAGNOSIS — R73.09 ELEVATED GLUCOSE: ICD-10-CM

## 2020-12-23 LAB
GLUCOSE 1H P CHAL SERPL-MCNC: 133 MG/DL (ref 65–180)
GLUCOSE 2H P CHAL SERPL-MCNC: 85 MG/DL (ref 65–155)
GLUCOSE 3H P CHAL SERPL-MCNC: 36 MG/DL (ref 65–140)
GLUCOSE BS SERPL-MCNC: 82 MG/DL (ref 65–95)

## 2020-12-23 PROCEDURE — 82951 GLUCOSE TOLERANCE TEST (GTT): CPT

## 2020-12-23 PROCEDURE — 36415 COLL VENOUS BLD VENIPUNCTURE: CPT

## 2020-12-23 PROCEDURE — 82952 GTT-ADDED SAMPLES: CPT

## 2021-01-13 ENCOUNTER — TELEPHONE (OUTPATIENT)
Dept: OBGYN | Facility: CLINIC | Age: 41
End: 2021-01-13

## 2021-01-14 ENCOUNTER — INITIAL PRENATAL (OUTPATIENT)
Dept: OBGYN | Facility: CLINIC | Age: 41
End: 2021-01-14
Payer: COMMERCIAL

## 2021-01-14 ENCOUNTER — HOSPITAL ENCOUNTER (OUTPATIENT)
Facility: MEDICAL CENTER | Age: 41
End: 2021-01-14
Attending: OBSTETRICS & GYNECOLOGY
Payer: COMMERCIAL

## 2021-01-14 VITALS
BODY MASS INDEX: 31.63 KG/M2 | WEIGHT: 208 LBS | SYSTOLIC BLOOD PRESSURE: 140 MMHG | DIASTOLIC BLOOD PRESSURE: 76 MMHG | HEART RATE: 72 BPM

## 2021-01-14 DIAGNOSIS — Z34.82 ENCOUNTER FOR SUPERVISION OF OTHER NORMAL PREGNANCY, SECOND TRIMESTER: ICD-10-CM

## 2021-01-14 DIAGNOSIS — O26.892 VAGINAL DISCHARGE DURING PREGNANCY IN SECOND TRIMESTER: ICD-10-CM

## 2021-01-14 DIAGNOSIS — O09.522 AMA (ADVANCED MATERNAL AGE) MULTIGRAVIDA 35+, SECOND TRIMESTER: ICD-10-CM

## 2021-01-14 DIAGNOSIS — O99.210 OBESITY IN PREGNANCY: ICD-10-CM

## 2021-01-14 DIAGNOSIS — N89.8 VAGINAL DISCHARGE DURING PREGNANCY IN SECOND TRIMESTER: ICD-10-CM

## 2021-01-14 PROBLEM — Z01.818 PREOPERATIVE EVALUATION TO RULE OUT SURGICAL CONTRAINDICATION: Status: RESOLVED | Noted: 2019-10-04 | Resolved: 2021-01-14

## 2021-01-14 LAB
APPEARANCE UR: CLEAR
BILIRUB UR STRIP-MCNC: NORMAL MG/DL
COLOR UR AUTO: YELLOW
GLUCOSE UR STRIP.AUTO-MCNC: NEGATIVE MG/DL
KETONES UR STRIP.AUTO-MCNC: NEGATIVE MG/DL
LEUKOCYTE ESTERASE UR QL STRIP.AUTO: NEGATIVE
NITRITE UR QL STRIP.AUTO: NEGATIVE
PH UR STRIP.AUTO: 5.5 [PH] (ref 5–8)
PROT UR QL STRIP: NEGATIVE MG/DL
RBC UR QL AUTO: NORMAL
SP GR UR STRIP.AUTO: >=1.03
UROBILINOGEN UR STRIP-MCNC: NORMAL MG/DL

## 2021-01-14 PROCEDURE — 81002 URINALYSIS NONAUTO W/O SCOPE: CPT | Performed by: OBSTETRICS & GYNECOLOGY

## 2021-01-14 PROCEDURE — 87491 CHLMYD TRACH DNA AMP PROBE: CPT

## 2021-01-14 PROCEDURE — 59402 PR NEW OB HIGH RISK: CPT | Performed by: OBSTETRICS & GYNECOLOGY

## 2021-01-14 PROCEDURE — 87591 N.GONORRHOEAE DNA AMP PROB: CPT

## 2021-01-14 ASSESSMENT — EDINBURGH POSTNATAL DEPRESSION SCALE (EPDS)
THINGS HAVE BEEN GETTING ON TOP OF ME: YES, SOMETIMES I HAVEN'T BEEN COPING AS WELL AS USUAL
I HAVE FELT SCARED OR PANICKY FOR NO GOOD REASON: NO, NOT AT ALL
THINGS HAVE BEEN GETTING ON TOP OF ME: NO, I HAVE BEEN COPING AS WELL AS EVER
I HAVE BEEN ANXIOUS OR WORRIED FOR NO GOOD REASON: YES, SOMETIMES
THE THOUGHT OF HARMING MYSELF HAS OCCURRED TO ME: HARDLY EVER
I HAVE BLAMED MYSELF UNNECESSARILY WHEN THINGS WENT WRONG: NO, NEVER
I HAVE LOOKED FORWARD WITH ENJOYMENT TO THINGS: AS MUCH AS I EVER DID
I HAVE FELT SAD OR MISERABLE: YES, QUITE OFTEN
I HAVE BEEN SO UNHAPPY THAT I HAVE HAD DIFFICULTY SLEEPING: YES, SOMETIMES
I HAVE BEEN SO UNHAPPY THAT I HAVE BEEN CRYING: NO, NEVER
I HAVE BEEN SO UNHAPPY THAT I HAVE BEEN CRYING: YES, QUITE OFTEN
TOTAL SCORE: 3
TOTAL SCORE: 17
THE THOUGHT OF HARMING MYSELF HAS OCCURRED TO ME: NEVER
I HAVE BEEN ABLE TO LAUGH AND SEE THE FUNNY SIDE OF THINGS: AS MUCH AS I ALWAYS COULD
I HAVE BLAMED MYSELF UNNECESSARILY WHEN THINGS WENT WRONG: YES, SOME OF THE TIME
I HAVE LOOKED FORWARD WITH ENJOYMENT TO THINGS: RATHER LESS THAN I USED TO
I HAVE BEEN SO UNHAPPY THAT I HAVE HAD DIFFICULTY SLEEPING: NOT AT ALL
I HAVE BEEN ABLE TO LAUGH AND SEE THE FUNNY SIDE OF THINGS: NOT QUITE SO MUCH NOW
I HAVE FELT SAD OR MISERABLE: NOT VERY OFTEN
I HAVE FELT SCARED OR PANICKY FOR NO GOOD REASON: YES, SOMETIMES
I HAVE BEEN ANXIOUS OR WORRIED FOR NO GOOD REASON: YES, SOMETIMES

## 2021-01-14 ASSESSMENT — FIBROSIS 4 INDEX: FIB4 SCORE: 0.51

## 2021-01-14 NOTE — NON-PROVIDER
NOB today  LMP: unknown  Last pap:2018  Phone #[phone]  Pharmacy confirmed  On PNV   GC/Ct today  Saw [facility] notes in media

## 2021-01-14 NOTE — TELEPHONE ENCOUNTER
Pt states is experiencing itchiness in vagina. Per Doctor Alvaro pt can take monistat for 7 days. Pt understood no further questions asked.

## 2021-01-14 NOTE — PROGRESS NOTES
Subjective:      Daria Fregoso is a 40 y.o. female who presents for new OB            HPI patient is a 40-year-old  3 para 0 at 13 weeks and 4 days gestation based on first trimester ultrasound who presents today for new OB exam.  Patient has had some anxiety and states symptoms are controlled.  Denies depression symptoms.  Reports normal bowel and bladder functions.  Denies any nausea vomiting or dysuria.  Denies any vaginal bleeding, pelvic pain or cramping.  Patient is taking prenatal vitamins daily    Patient is AMA and was seen by [facility] and states she had low risk NIPT testing done and she is scheduled for anatomy ultrasound around 18 weeks gestation.  She declines CF and SMA testing.    Reports no history of PID STDs such as herpes or any abnormal Pap smear.  States her last Pap smear was in 2018 and was normal    Patient states she had some vaginal  itching and cottage cheeselike discharge and she was told to try over-the-counter Monistat which she started yesterday    ROS all organ systems are reviewed and are currently negative       Objective:     /76   Wt 94.3 kg (208 lb)   BMI 31.63 kg/m²      Physical Exam  Vitals signs and nursing note reviewed. Exam conducted with a chaperone present.   Constitutional:       General: She is not in acute distress.     Appearance: Normal appearance. She is not toxic-appearing.   HENT:      Head: Normocephalic and atraumatic.      Nose: Nose normal.   Eyes:      General:         Right eye: No discharge.         Left eye: No discharge.      Conjunctiva/sclera: Conjunctivae normal.   Neck:      Musculoskeletal: Normal range of motion and neck supple. No neck rigidity.   Cardiovascular:      Rate and Rhythm: Normal rate and regular rhythm.      Pulses: Normal pulses.      Heart sounds: Normal heart sounds.   Pulmonary:      Effort: Pulmonary effort is normal. No respiratory distress.      Breath sounds: Normal breath  sounds. No wheezing.   Chest:      Breasts:         Right: No swelling, bleeding, inverted nipple, mass, nipple discharge, skin change or tenderness.         Left: No swelling, bleeding, inverted nipple, mass, nipple discharge, skin change or tenderness.      Comments: Patient has had breast augmentation surgery with large scars noted underneath both breasts and around the areola  Abdominal:      General: Abdomen is flat. Bowel sounds are normal. There is no distension.      Palpations: Abdomen is soft.      Tenderness: There is no abdominal tenderness.   Genitourinary:     Labia:         Right: No rash, tenderness, lesion or injury.         Left: No rash, tenderness, lesion or injury.       Urethra: No prolapse.      Vagina: Vaginal discharge present. No tenderness or bleeding.      Cervix: No cervical motion tenderness, friability or cervical bleeding.      Uterus: Enlarged. Not tender.       Adnexa:         Right: No mass, tenderness or fullness.          Left: No mass, tenderness or fullness.        Rectum: No external hemorrhoid.         Musculoskeletal: Normal range of motion.      Right lower leg: No edema.      Left lower leg: No edema.   Lymphadenopathy:      Cervical: No cervical adenopathy.      Upper Body:      Right upper body: No supraclavicular or axillary adenopathy.      Left upper body: No supraclavicular or axillary adenopathy.      Lower Body: No left inguinal adenopathy.   Skin:     General: Skin is warm and dry.      Coloration: Skin is not jaundiced.      Findings: No bruising.   Neurological:      General: No focal deficit present.      Mental Status: She is alert and oriented to person, place, and time.      Motor: No weakness.      Gait: Gait normal.   Psychiatric:         Mood and Affect: Mood normal.         Behavior: Behavior normal.         Thought Content: Thought content normal.         Judgment: Judgment normal.                 Assessment/Plan:        1. Encounter for supervision of  other normal pregnancy, second trimester  40-year-old  3 para 0 at 13 weeks and 4 days gestation here for new OB exam.  Exam is within normal limits  Pregnancy care by trimester discussed.  Pregnancy restrictions reviewed  We discussed exercise, recommended caloric and fluid intake, recommended weight gain.  We discussed screening for birth defects and chromosomal disorders in pregnancy.  She had NIPT testing done at perinatology and states result was low risk.  She has anatomy ultrasound scheduled with perinatologist.  Declined CF and SMA testing.  Patient is up-to-date with Pap smear per her report  - Chlamydia/GC PCR Urine Or Swab; Future  - POCT Urinalysis    2. Obesity in pregnancy  Obesity and obesity risks were discussed    3. AMA (advanced maternal age) multigravida 35+, second trimester  Advanced maternal age and risk in pregnancy were reviewed    4. Vaginal discharge during pregnancy in second trimester  Vaginal pathogen swab obtained  - VAGINAL PATHOGENS DNA PANEL; Future    5.  Pregnancy precautions and plan of care were reviewed.  New OB packet given.  Patient to follow-up in 4 weeks for OB care.

## 2021-01-15 ENCOUNTER — TELEPHONE (OUTPATIENT)
Dept: OBGYN | Facility: CLINIC | Age: 41
End: 2021-01-15

## 2021-01-15 LAB
C TRACH DNA SPEC QL NAA+PROBE: NEGATIVE
N GONORRHOEA DNA SPEC QL NAA+PROBE: NEGATIVE
SPECIMEN SOURCE: NORMAL

## 2021-01-15 NOTE — TELEPHONE ENCOUNTER
Received a call from [employer] reporting pt's vaginal pathogen swab from yesterday was sent unlabeled and it needed to be recollected.    Tejal BHAKTA MA verbally notified who roomed Pt for Dr. Strauss yesterday and will contact pt for recollection.

## 2021-02-11 ENCOUNTER — ROUTINE PRENATAL (OUTPATIENT)
Dept: OBGYN | Facility: CLINIC | Age: 41
End: 2021-02-11
Payer: COMMERCIAL

## 2021-02-11 VITALS — WEIGHT: 208 LBS | DIASTOLIC BLOOD PRESSURE: 66 MMHG | BODY MASS INDEX: 31.63 KG/M2 | SYSTOLIC BLOOD PRESSURE: 131 MMHG

## 2021-02-11 DIAGNOSIS — O09.92 SUPERVISION OF HIGH RISK PREGNANCY IN SECOND TRIMESTER: ICD-10-CM

## 2021-02-11 PROCEDURE — 90040 PR PRENATAL FOLLOW UP: CPT | Performed by: OBSTETRICS & GYNECOLOGY

## 2021-02-11 ASSESSMENT — FIBROSIS 4 INDEX: FIB4 SCORE: 0.51

## 2021-02-11 NOTE — PROGRESS NOTES
40 y.o.  17w4d The patient is here for routine obstetrical followup. She reports good fetal movement. She denies contractions, vaginal bleeding, or leaking of fluid.  She complains of feeling fatigued.  She also complains of reflux symptoms.  She is planning to travel to [place], and is very concerned about obtaining a Covid test.  She is also not wanting to complete her fetal ultrasound at [facility] as previously ordered, due to a high deductible on her insurance.    The patient's pregnancy is complicated by   Patient Active Problem List    Diagnosis Date Noted   • Encounter for supervision of other normal pregnancy, second trimester 2021   • Obesity in pregnancy 2021   • AMA (advanced maternal age) multigravida 35+, second trimester 2021   • Arthralgia of right elbow 2020   • Hyperlipidemia, unspecified 2020   • Arthralgia of hand 10/04/2019   AMA -followed by [facility].  NIPT results negative.  Advised patient to follow-up for fetal survey as previously ordered.  I did give her an ultrasound order to have her ultrasound performed in radiology if it is cost prohibitive for her to seek her care at high risk pregnancy center.  She is currently taking baby aspirin.  I offered her AFP for OSB risk determination.  She nines.  Reflux symptoms -counseled on diet, Tums, Pepcid as needed  Desires Covid test -I advised her on travel risks, and to check with her airline regarding specifics of test requirements.  Recommend follow-up with primary care for Covid testing ordering.      Followup in 4 weeks   labor precautions were discussed with patient  Fetal kick counts were discussed with patient

## 2021-02-11 NOTE — PROGRESS NOTES
Pt here today for OB follow up  Pt states no complaints  Reports +FM  Good # [phone]  Pharmacy Confirmed.  Chaperone offered and declined

## 2021-03-08 ENCOUNTER — HOSPITAL ENCOUNTER (OUTPATIENT)
Dept: RADIOLOGY | Facility: MEDICAL CENTER | Age: 41
End: 2021-03-08
Attending: OBSTETRICS & GYNECOLOGY
Payer: COMMERCIAL

## 2021-03-08 DIAGNOSIS — O09.92 SUPERVISION OF HIGH RISK PREGNANCY IN SECOND TRIMESTER: ICD-10-CM

## 2021-03-08 PROCEDURE — 76805 OB US >/= 14 WKS SNGL FETUS: CPT

## 2021-03-12 ENCOUNTER — ROUTINE PRENATAL (OUTPATIENT)
Dept: OBGYN | Facility: CLINIC | Age: 41
End: 2021-03-12
Payer: COMMERCIAL

## 2021-03-12 VITALS — DIASTOLIC BLOOD PRESSURE: 66 MMHG | SYSTOLIC BLOOD PRESSURE: 115 MMHG | BODY MASS INDEX: 32.39 KG/M2 | WEIGHT: 213 LBS

## 2021-03-12 DIAGNOSIS — O09.522 AMA (ADVANCED MATERNAL AGE) MULTIGRAVIDA 35+, SECOND TRIMESTER: ICD-10-CM

## 2021-03-12 PROCEDURE — 90040 PR PRENATAL FOLLOW UP: CPT | Performed by: OBSTETRICS & GYNECOLOGY

## 2021-03-12 ASSESSMENT — FIBROSIS 4 INDEX: FIB4 SCORE: 0.51

## 2021-03-12 NOTE — NON-PROVIDER
OB follow up   + fetal movement.  No VB, LOF or UC's.  Phone # [phone]  Preferred pharmacy confirmed.  [facility]: 03/16/2021  C/o some pain in the lower abdomen when sneezing or coughing, and some SOB

## 2021-03-12 NOTE — PROGRESS NOTES
S: Pt presents for routine OB follow up.  No contractions, vaginal bleeding, or leaking fluids.   Pt complains of bilateral redness along her shins, snoring at night, having a dry mouth. Occasional fetal movement but not regular.     O: /66   Wt 96.6 kg (213 lb)   BMI 32.39 kg/m²   Vitals:    21 1321   BP: 115/66   Weight: 96.6 kg (213 lb)     Vitals, fundal height , fetal position, and FHR reviewed on flowsheet    Patient Active Problem List   Diagnosis   • Arthralgia of hand   • Arthralgia of right elbow   • Hyperlipidemia, unspecified   • Encounter for supervision of other normal pregnancy, second trimester   • Obesity in pregnancy   • AMA (advanced maternal age) multigravida 35+, second trimester       Lab:  Recent Labs     20  0933   ABOGROUP O   RUBELLAIGG 86.30   HEPBSAG Non-Reactive       A/P:  40 y.o.  at 21w5d presents for routine obstetric follow-up.     #Prenatal care  - Continue prenatal vitamins.  Estimated Date of Delivery: 2021 by 8wk US    PNL: Rh+/-, RI, wnl   [x] early glucola 175 --> 3hr wnl (BG 32 at 3hrs)    Aneuploidy screening: [x ] normal NT; NiPT ordered through [facility] was XY low risk.   AMA: referred to [facility], appt 3/16/2021 but decided not to go 2/2 cost.     Anatomy US: normal anatomy at 22wks, CL 3.8cm, placenta anterior, no previa    Glucola/3rd tri labs: [ ]   Rhogam: [ ]     Tdap: [ ]  Flu vacccine [ ] declines    GBS [ ]     Elevated BP at 8wk visit, denies hx of cHTN  [x] baseline p/c 53mg, Cr 0.53  [ x] ASA 81mg daily    Cervical polyp noted on exam        - Follow-up: 4 weeks.

## 2021-04-07 ENCOUNTER — TELEPHONE (OUTPATIENT)
Dept: OBGYN | Facility: CLINIC | Age: 41
End: 2021-04-07

## 2021-04-07 NOTE — TELEPHONE ENCOUNTER
PT called asking if it is safe for her to get the moderna vaccine while being pregnant. Pt aslo explains she is having knee swelling/ pain. I consulted with a doctor and they state that yes the moderna vaccine is ok fir her to get while being pregnant. And for the knee pain she can ice it, keep it elevated, and also take tylenol ( 3,000 mg MAX in 24 hrs ) PT verbally understands and agrees.

## 2021-04-12 ENCOUNTER — ROUTINE PRENATAL (OUTPATIENT)
Dept: OBGYN | Facility: CLINIC | Age: 41
End: 2021-04-12
Payer: COMMERCIAL

## 2021-04-12 VITALS — SYSTOLIC BLOOD PRESSURE: 128 MMHG | DIASTOLIC BLOOD PRESSURE: 83 MMHG | BODY MASS INDEX: 32.69 KG/M2 | WEIGHT: 215 LBS

## 2021-04-12 DIAGNOSIS — O09.92 SUPERVISION OF HIGH RISK PREGNANCY IN SECOND TRIMESTER: Primary | ICD-10-CM

## 2021-04-12 DIAGNOSIS — O44.02 PLACENTA PREVIA IN SECOND TRIMESTER: ICD-10-CM

## 2021-04-12 PROBLEM — O44.00 PLACENTA PREVIA: Status: ACTIVE | Noted: 2021-04-12

## 2021-04-12 PROCEDURE — 90040 PR PRENATAL FOLLOW UP: CPT | Performed by: NURSE PRACTITIONER

## 2021-04-12 RX ORDER — ACETAMINOPHEN 325 MG/1
650 TABLET ORAL EVERY 4 HOURS PRN
COMMUNITY

## 2021-04-12 ASSESSMENT — FIBROSIS 4 INDEX: FIB4 SCORE: 0.51

## 2021-04-12 NOTE — PROGRESS NOTES
Pt here today for OB follow up  Reports +FM  WT: 215 lb  BP: 128/83  Preferred pharmacy verified with pt.  Pt states having inflammation and pain on left knee. Pt also reports having feeling always thirsty.  3rd trimester labs ordered today with the 3 hr gtt pt given instructions  Pt had appt with [facility] 03/16/2021.   Good # [phone]

## 2021-04-12 NOTE — PROGRESS NOTES
"S) Pt is a 40 y.o.   at 26w1d  gestation. Routine prenatal care today. Has f/u appt with [facility] in [place] May for previa.  They told her \"it needs to move 1/2 cm in order for her to have vaginal delivery\"  However, mention of placenta \"abut\" with the  cervical os in their report on 3/16.      She wants to go to [place] 1st [place] and is wondering if OK.  She is getting her first Covid vaccine tomorrow.      Feeling thirsty, yusuf in am.  States she is snoring so possibly why.     Fetal movement Normal  Cramping no  VB no  LOF no   Denies dysuria. Generally feels well today. Good self-care activities identified. Denies headaches, swelling, visual changes, or epigastric pain .     O) See flow sheet for vital signs and fetal measurements.          Labs:       PNL: WNL       GCT:early 1 hour elevated, 3 hour WNL       AFP: NIPT low risk       GBS: N/A       Pertinent ultrasound - 3/8/21 normal anatomy scan       3/16 previa noted but does not specify how far     A) IUP at 26w1d       S=D         Patient Active Problem List    Diagnosis Date Noted   • Encounter for supervision of other normal pregnancy, second trimester 2021   • Obesity in pregnancy 2021   • AMA (advanced maternal age) multigravida 35+, second trimester 2021   • Arthralgia of right elbow 2020   • Hyperlipidemia, unspecified 2020   • Arthralgia of hand 10/04/2019          SVE:deferred         TDAP: no       FLU: no        BTL: no       : no       C/S Consent: no       IOL or C/S scheduled: no       LAST PAP:  NILM         P) s/s ptl vs general discomforts. Fetal movements reviewed. General ed and anticipatory guidance. Nutrition/exercise/vitamin. Plans breast Plans pp contraception- unsure  Continue PNV.   Bleeding precautions discussed.  Pelvic rest reinforced.    Pt advised against travel d/t high risk with previa.  Explained to pt what a previa is and what could possibly happen as well as risk for c/s if does not " resolve.    Discussed possible side effects following covid vaccine and how to manage.    Given 3rd trimester labs with instructions  RTC 2 weeks or PRN.

## 2021-04-17 ENCOUNTER — HOSPITAL ENCOUNTER (OUTPATIENT)
Dept: LAB | Facility: MEDICAL CENTER | Age: 41
End: 2021-04-17
Attending: NURSE PRACTITIONER
Payer: COMMERCIAL

## 2021-04-17 DIAGNOSIS — O09.92 SUPERVISION OF HIGH RISK PREGNANCY IN SECOND TRIMESTER: ICD-10-CM

## 2021-04-17 LAB
BASOPHILS # BLD AUTO: 0.5 % (ref 0–1.8)
BASOPHILS # BLD: 0.07 K/UL (ref 0–0.12)
EOSINOPHIL # BLD AUTO: 0.31 K/UL (ref 0–0.51)
EOSINOPHIL NFR BLD: 2.4 % (ref 0–6.9)
ERYTHROCYTE [DISTWIDTH] IN BLOOD BY AUTOMATED COUNT: 51.6 FL (ref 35.9–50)
GLUCOSE 1H P CHAL SERPL-MCNC: 178 MG/DL (ref 65–180)
GLUCOSE 2H P CHAL SERPL-MCNC: 137 MG/DL (ref 65–155)
GLUCOSE 3H P CHAL SERPL-MCNC: 80 MG/DL (ref 65–140)
GLUCOSE BS SERPL-MCNC: 78 MG/DL (ref 65–95)
HCT VFR BLD AUTO: 41.4 % (ref 37–47)
HGB BLD-MCNC: 13.3 G/DL (ref 12–16)
IMM GRANULOCYTES # BLD AUTO: 0.14 K/UL (ref 0–0.11)
IMM GRANULOCYTES NFR BLD AUTO: 1.1 % (ref 0–0.9)
LYMPHOCYTES # BLD AUTO: 2.43 K/UL (ref 1–4.8)
LYMPHOCYTES NFR BLD: 19 % (ref 22–41)
MCH RBC QN AUTO: 31.1 PG (ref 27–33)
MCHC RBC AUTO-ENTMCNC: 32.1 G/DL (ref 33.6–35)
MCV RBC AUTO: 96.7 FL (ref 81.4–97.8)
MONOCYTES # BLD AUTO: 0.58 K/UL (ref 0–0.85)
MONOCYTES NFR BLD AUTO: 4.5 % (ref 0–13.4)
NEUTROPHILS # BLD AUTO: 9.27 K/UL (ref 2–7.15)
NEUTROPHILS NFR BLD: 72.5 % (ref 44–72)
NRBC # BLD AUTO: 0 K/UL
NRBC BLD-RTO: 0 /100 WBC
PLATELET # BLD AUTO: 277 K/UL (ref 164–446)
PMV BLD AUTO: 10.7 FL (ref 9–12.9)
RBC # BLD AUTO: 4.28 M/UL (ref 4.2–5.4)
TREPONEMA PALLIDUM IGG+IGM AB [PRESENCE] IN SERUM OR PLASMA BY IMMUNOASSAY: NORMAL
WBC # BLD AUTO: 12.8 K/UL (ref 4.8–10.8)

## 2021-04-17 PROCEDURE — 82951 GLUCOSE TOLERANCE TEST (GTT): CPT

## 2021-04-17 PROCEDURE — 36415 COLL VENOUS BLD VENIPUNCTURE: CPT

## 2021-04-17 PROCEDURE — 86780 TREPONEMA PALLIDUM: CPT

## 2021-04-17 PROCEDURE — 82952 GTT-ADDED SAMPLES: CPT

## 2021-04-17 PROCEDURE — 85025 COMPLETE CBC W/AUTO DIFF WBC: CPT

## 2021-05-07 ENCOUNTER — ROUTINE PRENATAL (OUTPATIENT)
Dept: OBGYN | Facility: CLINIC | Age: 41
End: 2021-05-07
Payer: COMMERCIAL

## 2021-05-07 VITALS — DIASTOLIC BLOOD PRESSURE: 73 MMHG | WEIGHT: 217.4 LBS | BODY MASS INDEX: 33.06 KG/M2 | SYSTOLIC BLOOD PRESSURE: 115 MMHG

## 2021-05-07 DIAGNOSIS — O44.02 PLACENTA PREVIA IN SECOND TRIMESTER: ICD-10-CM

## 2021-05-07 DIAGNOSIS — O09.522 AMA (ADVANCED MATERNAL AGE) MULTIGRAVIDA 35+, SECOND TRIMESTER: ICD-10-CM

## 2021-05-07 DIAGNOSIS — Z34.82 ENCOUNTER FOR SUPERVISION OF OTHER NORMAL PREGNANCY, SECOND TRIMESTER: ICD-10-CM

## 2021-05-07 DIAGNOSIS — O99.210 OBESITY IN PREGNANCY: ICD-10-CM

## 2021-05-07 PROCEDURE — 90040 PR PRENATAL FOLLOW UP: CPT | Performed by: OBSTETRICS & GYNECOLOGY

## 2021-05-07 ASSESSMENT — FIBROSIS 4 INDEX: FIB4 SCORE: 0.67

## 2021-05-07 NOTE — PROGRESS NOTES
Pt is here for OB Follow-up visit  Good Phone#:[phone]  Pharmacy verified.  Reports + Fetal movement.  Pt denies VB, LOF, and Uc's.  Pt states no other complaints for today.  Kick count sheet given and explained.  Pt declined Tdap.

## 2021-05-07 NOTE — LETTER
"Count Your Baby's Movements  Another step to a healthy delivery    Daria Fregoso             Dept: [phone]    How Many Weeks Pregnant:29w5d    Date to Begin Countin2021              How to use this chart    One way for your physician to keep track of your baby's health is by knowing how often the baby moves (or \"kicks\") in your womb.  You can help your physician to do this by using this chart every day.    Every day, you should see how many hours it takes for your baby to move 10 times.  Start in the morning, as soon as you get up.    · First, write down the time your baby moves until you get to 10.  · Check off one box every time your baby moves until you get to 10.  · Write down the time you finished counting in the last column.  · Total how long it took to count up all 10 movements.  · Finally, fill in the box that shows how long this took.  After counting 10 movements, you no longer have to count any more that day.  The next morning, just start counting again as soon as you get up.    What should you call a \"movement\"?  It is hard to say, because it will feel different from one mother to another and from one pregnancy to the next.  The important thing is that you count the movements the same way throughout your pregnancy.  If you have more questions, you should ask your physician.    Count carefully every day!  SAMPLE:  Week 28    How many hours did it take to feel 10 movements?       Start  Time     1     2     3     4     5     6     7     8     9     10   Finish Time   Mon 8:20 ·  ·  ·  ·  ·  ·  ·  ·  ·  ·  11:40                  Sat               Sun                 IMPORTANT: You should contact your physician if it takes more than two hours for you to feel 10 movements.  Each morning, write down the time and start to count the movements of your baby.  Keep track by checking off one box every time you feel one movement.  When you have felt " "10 \"kicks\", write down the time you finished counting in the last column.  Then fill in the   box (over the check brandon) for the number of hours it took.  Be sure to read the complete instructions on the previous page.            "

## 2021-05-12 NOTE — PROGRESS NOTES
Spoke with pt regarding [employer] for [employer] message sent yesterday.  Verified name and .  Feeling better today, just tired.  No further tachycardia, no s/s of fever or chills, just sore arm. Normal FM today.  Reinforced may go to hospital if concerned at all.

## 2021-05-17 ENCOUNTER — ROUTINE PRENATAL (OUTPATIENT)
Dept: OBGYN | Facility: CLINIC | Age: 41
End: 2021-05-17
Payer: COMMERCIAL

## 2021-05-17 VITALS — WEIGHT: 215 LBS | DIASTOLIC BLOOD PRESSURE: 73 MMHG | SYSTOLIC BLOOD PRESSURE: 120 MMHG | BODY MASS INDEX: 32.69 KG/M2

## 2021-05-17 DIAGNOSIS — O09.523 AMA (ADVANCED MATERNAL AGE) MULTIGRAVIDA 35+, THIRD TRIMESTER: ICD-10-CM

## 2021-05-17 DIAGNOSIS — O44.03 PLACENTA PREVIA IN THIRD TRIMESTER: ICD-10-CM

## 2021-05-17 DIAGNOSIS — O99.210 OBESITY IN PREGNANCY: ICD-10-CM

## 2021-05-17 DIAGNOSIS — O09.93 SUPERVISION OF HIGH RISK PREGNANCY IN THIRD TRIMESTER: ICD-10-CM

## 2021-05-17 PROCEDURE — 90040 PR PRENATAL FOLLOW UP: CPT | Performed by: OBSTETRICS & GYNECOLOGY

## 2021-05-17 ASSESSMENT — FIBROSIS 4 INDEX: FIB4 SCORE: 0.67

## 2021-05-17 NOTE — PROGRESS NOTES
S: Pt presents for routine OB follow up.  Patient is doing well without complaints.  Reports good fetal movement.  Denies headaches or scotoma.  Reports normal bowel and bladder functions  No contractions, vaginal bleeding, or leakage of fluid.  Patient has placenta previa and has follow-up in 2 weeks with perinatology for repeat ultrasound  Questions answered.    O: /73   Wt 97.5 kg (215 lb)   BMI 32.69 kg/m²   Patients' weight gain, fluid intake and exercise level discussed.  Vitals, fundal height , fetal position, and FHR reviewed on flowsheet    Lab:No results found for this or any previous visit (from the past 336 hour(s)).    A/P:  40 y.o.  at 31w1d presents for routine obstetric follow-up.  Doing well currently  Size equals dates .  Abnormal 1 hour GCT with normal 3-hour GTT. doing well currently.  Has follow-up ultrasound for placenta previa.  We discussed delivery at 39 weeks-route of delivery will be based on follow-up placenta/ultrasound results  Encounter Diagnoses   Name Primary?   • Supervision of high risk pregnancy in third trimester    • AMA (advanced maternal age) multigravida 35+, third trimester    • Obesity in pregnancy    • Placenta previa in third trimester          1.  Continue prenatal vitamins.  2.  Fetal kick counts daily reviewed.  3.  Exercise at least 30 minutes daily.  4.  Drink at least 2L of water daily  5.  Pregnancy and  labor precautions educated.  6.  Follow-up in 2 weeks.  7.  GBS culture at 36 weeks

## 2021-05-17 NOTE — PROGRESS NOTES
OB follow up   + fetal movement.  No VB, LOF or UC's.  Phone # [phone]  Preferred pharmacy confirmed  Pt denies any problems at this time

## 2021-06-02 ENCOUNTER — ROUTINE PRENATAL (OUTPATIENT)
Dept: OBGYN | Facility: CLINIC | Age: 41
End: 2021-06-02
Payer: COMMERCIAL

## 2021-06-02 VITALS — DIASTOLIC BLOOD PRESSURE: 67 MMHG | SYSTOLIC BLOOD PRESSURE: 119 MMHG | WEIGHT: 221 LBS | BODY MASS INDEX: 33.6 KG/M2

## 2021-06-02 DIAGNOSIS — O09.93 SUPERVISION OF HIGH RISK PREGNANCY IN THIRD TRIMESTER: ICD-10-CM

## 2021-06-02 DIAGNOSIS — O09.522 AMA (ADVANCED MATERNAL AGE) MULTIGRAVIDA 35+, SECOND TRIMESTER: ICD-10-CM

## 2021-06-02 DIAGNOSIS — O99.210 OBESITY IN PREGNANCY: ICD-10-CM

## 2021-06-02 PROBLEM — O44.00 PLACENTA PREVIA: Status: RESOLVED | Noted: 2021-04-12 | Resolved: 2021-06-02

## 2021-06-02 PROCEDURE — 90040 PR PRENATAL FOLLOW UP: CPT | Performed by: OBSTETRICS & GYNECOLOGY

## 2021-06-02 ASSESSMENT — FIBROSIS 4 INDEX: FIB4 SCORE: 0.67

## 2021-06-02 NOTE — PROGRESS NOTES
OB follow up   + fetal movement.  No VB, LOF or UC's.  Phone # [phone]  Preferred pharmacy confirmed.  3hr GTT WNL

## 2021-06-15 ENCOUNTER — ROUTINE PRENATAL (OUTPATIENT)
Dept: OBGYN | Facility: CLINIC | Age: 41
End: 2021-06-15
Payer: COMMERCIAL

## 2021-06-15 VITALS — SYSTOLIC BLOOD PRESSURE: 119 MMHG | BODY MASS INDEX: 33.89 KG/M2 | WEIGHT: 222.9 LBS | DIASTOLIC BLOOD PRESSURE: 71 MMHG

## 2021-06-15 DIAGNOSIS — Z34.93 THIRD TRIMESTER PREGNANCY: ICD-10-CM

## 2021-06-15 PROCEDURE — 90040 PR PRENATAL FOLLOW UP: CPT | Performed by: OBSTETRICS & GYNECOLOGY

## 2021-06-15 ASSESSMENT — FIBROSIS 4 INDEX: FIB4 SCORE: 0.67

## 2021-06-15 NOTE — PROGRESS NOTES
Pt's here for OB follow-up  Reports + fetal movement  No VB, LOF or UC's.  Good Phone #:[phone]  Pharmacy verified.  Pt states she was seen at [facility] on 5/25/2021.  Pt states no complaints for today.

## 2021-06-22 ENCOUNTER — HOSPITAL ENCOUNTER (OUTPATIENT)
Facility: MEDICAL CENTER | Age: 41
End: 2021-06-22
Attending: OBSTETRICS & GYNECOLOGY
Payer: COMMERCIAL

## 2021-06-22 ENCOUNTER — ROUTINE PRENATAL (OUTPATIENT)
Dept: OBGYN | Facility: CLINIC | Age: 41
End: 2021-06-22
Payer: COMMERCIAL

## 2021-06-22 VITALS — SYSTOLIC BLOOD PRESSURE: 121 MMHG | DIASTOLIC BLOOD PRESSURE: 74 MMHG | BODY MASS INDEX: 33.91 KG/M2 | WEIGHT: 223 LBS

## 2021-06-22 DIAGNOSIS — O09.522 AMA (ADVANCED MATERNAL AGE) MULTIGRAVIDA 35+, SECOND TRIMESTER: ICD-10-CM

## 2021-06-22 DIAGNOSIS — O99.210 OBESITY IN PREGNANCY: ICD-10-CM

## 2021-06-22 DIAGNOSIS — O09.93 SUPERVISION OF HIGH RISK PREGNANCY IN THIRD TRIMESTER: ICD-10-CM

## 2021-06-22 LAB
NST ACOUSTIC STIMULATION: NO
NST ACTION NECESSARY: NORMAL
NST ASSESSMENT: NORMAL
NST BASELINE: NORMAL
NST INDICATIONS: NORMAL
NST OTHER DATA: NORMAL
NST READ BY: NORMAL
NST RETURN: NORMAL
NST UTERINE ACTIVITY: NO

## 2021-06-22 PROCEDURE — 87150 DNA/RNA AMPLIFIED PROBE: CPT

## 2021-06-22 PROCEDURE — 90040 PR PRENATAL FOLLOW UP: CPT | Performed by: OBSTETRICS & GYNECOLOGY

## 2021-06-22 PROCEDURE — 90715 TDAP VACCINE 7 YRS/> IM: CPT | Performed by: OBSTETRICS & GYNECOLOGY

## 2021-06-22 PROCEDURE — 87081 CULTURE SCREEN ONLY: CPT

## 2021-06-22 PROCEDURE — 90471 IMMUNIZATION ADMIN: CPT | Performed by: OBSTETRICS & GYNECOLOGY

## 2021-06-22 ASSESSMENT — FIBROSIS 4 INDEX: FIB4 SCORE: 0.67

## 2021-06-22 NOTE — PROGRESS NOTES
S: Pt presents for routine OB follow up.  Patient is doing well currently.  Reports good fetal movement.  No contractions, vaginal bleeding, or leakage of fluid.  Denies headaches or scotoma.  Reports normal bowel and bladder functions.  Patient states she has growth ultrasound today with perinatology.  Questions answered.    O: /88   Wt 101 kg (223 lb)   BMI 33.91 kg/m²   Patients' weight gain, fluid intake and exercise level discussed.  Vitals, fundal height , fetal position, and FHR reviewed on flowsheet    Lab:No results found for this or any previous visit (from the past 336 hour(s)).    A/P:  40 y.o.  at 36w2d presents for routine obstetric follow-up.  Doing well currently.  Size equals dates.  NST done today and is reactive.  We discussed weekly NST until delivery.  We discussed AMA and indications for delivery at 39 weeks gestation.  Encounter Diagnoses   Name Primary?   • Supervision of high risk pregnancy in third trimester    • AMA (advanced maternal age) multigravida 35+, second trimester    • Obesity in pregnancy          1.  Continue prenatal vitamins.  2.  Fetal kick counts daily discussed.  3.  Exercise at least 30 minutes daily.  4.  Drink at least 2L of water daily  5.  Labor precautions educated.  6.  Follow-up in 1 week  7.  GBS culture obtained today

## 2021-06-22 NOTE — PROGRESS NOTES
OB follow up   +Kody PERALES LOF  Phone # [phone]  Preferred pharmacy confirmed  GBS today  Pt has US scheduled for today  Pt c/o irregular UC  Desires TDap today  TDap given to patient on R Deltoid. Screening checklist reviewed with patient. VIS given. Verified by GASTON

## 2021-06-23 LAB — GP B STREP DNA SPEC QL NAA+PROBE: NEGATIVE

## 2021-06-29 ENCOUNTER — APPOINTMENT (OUTPATIENT)
Dept: OBGYN | Facility: CLINIC | Age: 41
End: 2021-06-29
Payer: COMMERCIAL

## 2021-06-29 ENCOUNTER — ROUTINE PRENATAL (OUTPATIENT)
Dept: OBGYN | Facility: CLINIC | Age: 41
End: 2021-06-29
Payer: COMMERCIAL

## 2021-06-29 VITALS — WEIGHT: 221 LBS | SYSTOLIC BLOOD PRESSURE: 128 MMHG | DIASTOLIC BLOOD PRESSURE: 60 MMHG | BODY MASS INDEX: 33.6 KG/M2

## 2021-06-29 DIAGNOSIS — O09.522 AMA (ADVANCED MATERNAL AGE) MULTIGRAVIDA 35+, SECOND TRIMESTER: Primary | ICD-10-CM

## 2021-06-29 PROCEDURE — 90040 PR PRENATAL FOLLOW UP: CPT | Mod: 25 | Performed by: OBSTETRICS & GYNECOLOGY

## 2021-06-29 PROCEDURE — 59025 FETAL NON-STRESS TEST: CPT | Performed by: OBSTETRICS & GYNECOLOGY

## 2021-06-29 ASSESSMENT — FIBROSIS 4 INDEX: FIB4 SCORE: 0.67

## 2021-07-08 ENCOUNTER — ROUTINE PRENATAL (OUTPATIENT)
Dept: OBGYN | Facility: CLINIC | Age: 41
End: 2021-07-08
Payer: COMMERCIAL

## 2021-07-08 ENCOUNTER — APPOINTMENT (OUTPATIENT)
Dept: OBGYN | Facility: CLINIC | Age: 41
End: 2021-07-08
Payer: COMMERCIAL

## 2021-07-08 VITALS — DIASTOLIC BLOOD PRESSURE: 61 MMHG | BODY MASS INDEX: 33.91 KG/M2 | SYSTOLIC BLOOD PRESSURE: 122 MMHG | WEIGHT: 223 LBS

## 2021-07-08 DIAGNOSIS — O99.210 OBESITY IN PREGNANCY: ICD-10-CM

## 2021-07-08 DIAGNOSIS — O09.93 SUPERVISION OF HIGH RISK PREGNANCY IN THIRD TRIMESTER: ICD-10-CM

## 2021-07-08 DIAGNOSIS — O09.522 AMA (ADVANCED MATERNAL AGE) MULTIGRAVIDA 35+, SECOND TRIMESTER: ICD-10-CM

## 2021-07-08 PROCEDURE — 90040 PR PRENATAL FOLLOW UP: CPT | Performed by: OBSTETRICS & GYNECOLOGY

## 2021-07-08 ASSESSMENT — FIBROSIS 4 INDEX: FIB4 SCORE: 0.67

## 2021-07-08 NOTE — PROGRESS NOTES
S: Pt presents for routine OB follow up and NST.  Reports good fetal movement.  Denies headaches or scotoma.  Reports normal bowel and bladder functions  No contractions, vaginal bleeding, or leakage of fluid.    Questions answered.    O:   Patients' weight gain, fluid intake and exercise level discussed.  Vitals, fundal height , fetal position, and FHR reviewed on flowsheet    Lab:No results found for this or any previous visit (from the past 336 hour(s)).    A/P:  40 y.o.  at 38w4d presents for routine obstetric follow-up and NST due to AMA status.  Size equals dates.  NST is reactive.  Induction of labor planned at 39 weeks gestation-. IOL discussed  Encounter Diagnoses   Name Primary?   • AMA (advanced maternal age) multigravida 35+, second trimester    • Obesity in pregnancy            1.  Continue prenatal vitamins.  2.  Fetal kick counts daily reviewed.  3.  Exercise at least 30 minutes daily.  4.  Drink at least 2L of water daily  5.  Pregnancy and labor precautions educated.  6.  Follow-up in 1 week for OB and NST  7.  GBS culture is negative

## 2021-07-11 ENCOUNTER — APPOINTMENT (OUTPATIENT)
Dept: OBGYN | Facility: MEDICAL CENTER | Age: 41
End: 2021-07-11
Attending: OBSTETRICS & GYNECOLOGY
Payer: COMMERCIAL

## 2021-07-11 ENCOUNTER — HOSPITAL ENCOUNTER (INPATIENT)
Facility: MEDICAL CENTER | Age: 41
LOS: 6 days | End: 2021-07-17
Attending: OBSTETRICS & GYNECOLOGY | Admitting: OBSTETRICS & GYNECOLOGY
Payer: COMMERCIAL

## 2021-07-11 PROCEDURE — A9270 NON-COVERED ITEM OR SERVICE: HCPCS | Performed by: PHYSICIAN ASSISTANT

## 2021-07-11 PROCEDURE — 770002 HCHG ROOM/CARE - OB PRIVATE (112)

## 2021-07-11 PROCEDURE — 85025 COMPLETE CBC W/AUTO DIFF WBC: CPT

## 2021-07-11 PROCEDURE — 700102 HCHG RX REV CODE 250 W/ 637 OVERRIDE(OP): Performed by: PHYSICIAN ASSISTANT

## 2021-07-11 RX ADMIN — DINOPROSTONE 0.5 MG: 0.5 GEL VAGINAL at 23:03

## 2021-07-11 ASSESSMENT — PAIN SCALES - GENERAL: PAINLEVEL: 0 - NO PAIN

## 2021-07-11 ASSESSMENT — FIBROSIS 4 INDEX
FIB4 SCORE: 0.67
FIB4 SCORE: 0.67

## 2021-07-12 LAB
BASOPHILS # BLD AUTO: 0.5 % (ref 0–1.8)
BASOPHILS # BLD: 0.06 K/UL (ref 0–0.12)
EOSINOPHIL # BLD AUTO: 0.15 K/UL (ref 0–0.51)
EOSINOPHIL NFR BLD: 1.2 % (ref 0–6.9)
ERYTHROCYTE [DISTWIDTH] IN BLOOD BY AUTOMATED COUNT: 46.7 FL (ref 35.9–50)
HCT VFR BLD AUTO: 43.6 % (ref 37–47)
HGB BLD-MCNC: 14.9 G/DL (ref 12–16)
HOLDING TUBE BB 8507: NORMAL
IMM GRANULOCYTES # BLD AUTO: 0.08 K/UL (ref 0–0.11)
IMM GRANULOCYTES NFR BLD AUTO: 0.7 % (ref 0–0.9)
LYMPHOCYTES # BLD AUTO: 2.71 K/UL (ref 1–4.8)
LYMPHOCYTES NFR BLD: 22.5 % (ref 22–41)
MCH RBC QN AUTO: 31.4 PG (ref 27–33)
MCHC RBC AUTO-ENTMCNC: 34.2 G/DL (ref 33.6–35)
MCV RBC AUTO: 91.8 FL (ref 81.4–97.8)
MONOCYTES # BLD AUTO: 0.63 K/UL (ref 0–0.85)
MONOCYTES NFR BLD AUTO: 5.2 % (ref 0–13.4)
NEUTROPHILS # BLD AUTO: 8.39 K/UL (ref 2–7.15)
NEUTROPHILS NFR BLD: 69.9 % (ref 44–72)
NRBC # BLD AUTO: 0 K/UL
NRBC BLD-RTO: 0 /100 WBC
PLATELET # BLD AUTO: 234 K/UL (ref 164–446)
PMV BLD AUTO: 12.1 FL (ref 9–12.9)
RBC # BLD AUTO: 4.75 M/UL (ref 4.2–5.4)
WBC # BLD AUTO: 12 K/UL (ref 4.8–10.8)

## 2021-07-12 PROCEDURE — A9270 NON-COVERED ITEM OR SERVICE: HCPCS | Performed by: PHYSICIAN ASSISTANT

## 2021-07-12 PROCEDURE — A9270 NON-COVERED ITEM OR SERVICE: HCPCS | Performed by: FAMILY MEDICINE

## 2021-07-12 PROCEDURE — 700111 HCHG RX REV CODE 636 W/ 250 OVERRIDE (IP): Performed by: OBSTETRICS & GYNECOLOGY

## 2021-07-12 PROCEDURE — 700102 HCHG RX REV CODE 250 W/ 637 OVERRIDE(OP): Performed by: PHYSICIAN ASSISTANT

## 2021-07-12 PROCEDURE — 770002 HCHG ROOM/CARE - OB PRIVATE (112)

## 2021-07-12 PROCEDURE — 3E0DXGC INTRODUCTION OF OTHER THERAPEUTIC SUBSTANCE INTO MOUTH AND PHARYNX, EXTERNAL APPROACH: ICD-10-PCS | Performed by: OBSTETRICS & GYNECOLOGY

## 2021-07-12 PROCEDURE — 3E033VJ INTRODUCTION OF OTHER HORMONE INTO PERIPHERAL VEIN, PERCUTANEOUS APPROACH: ICD-10-PCS | Performed by: OBSTETRICS & GYNECOLOGY

## 2021-07-12 PROCEDURE — 700111 HCHG RX REV CODE 636 W/ 250 OVERRIDE (IP): Performed by: PHYSICIAN ASSISTANT

## 2021-07-12 PROCEDURE — 700102 HCHG RX REV CODE 250 W/ 637 OVERRIDE(OP): Performed by: FAMILY MEDICINE

## 2021-07-12 RX ORDER — SODIUM CHLORIDE, SODIUM LACTATE, POTASSIUM CHLORIDE, CALCIUM CHLORIDE 600; 310; 30; 20 MG/100ML; MG/100ML; MG/100ML; MG/100ML
INJECTION, SOLUTION INTRAVENOUS CONTINUOUS
Status: ACTIVE | OUTPATIENT
Start: 2021-07-12 | End: 2021-07-13

## 2021-07-12 RX ORDER — METHYLERGONOVINE MALEATE 0.2 MG/ML
0.2 INJECTION INTRAVENOUS
Status: DISCONTINUED | OUTPATIENT
Start: 2021-07-12 | End: 2021-07-13 | Stop reason: HOSPADM

## 2021-07-12 RX ORDER — MISOPROSTOL 200 UG/1
800 TABLET ORAL
Status: DISCONTINUED | OUTPATIENT
Start: 2021-07-12 | End: 2021-07-13 | Stop reason: HOSPADM

## 2021-07-12 RX ORDER — SODIUM CHLORIDE, SODIUM LACTATE, POTASSIUM CHLORIDE, CALCIUM CHLORIDE 600; 310; 30; 20 MG/100ML; MG/100ML; MG/100ML; MG/100ML
INJECTION, SOLUTION INTRAVENOUS CONTINUOUS
Status: ACTIVE | OUTPATIENT
Start: 2021-07-12 | End: 2021-07-12

## 2021-07-12 RX ADMIN — MISOPROSTOL 25 MCG: 100 TABLET ORAL at 05:11

## 2021-07-12 RX ADMIN — OXYTOCIN 1 MILLI-UNITS/MIN: 10 INJECTION, SOLUTION INTRAMUSCULAR; INTRAVENOUS at 13:35

## 2021-07-12 RX ADMIN — DINOPROSTONE 10 MG: 10 INSERT VAGINAL at 17:48

## 2021-07-12 RX ADMIN — FENTANYL CITRATE 100 MCG: 50 INJECTION, SOLUTION INTRAMUSCULAR; INTRAVENOUS at 23:34

## 2021-07-12 ASSESSMENT — LIFESTYLE VARIABLES
CONSUMPTION TOTAL: INCOMPLETE
EVER_SMOKED: NEVER
EVER FELT BAD OR GUILTY ABOUT YOUR DRINKING: NO
TOTAL SCORE: 0
TOTAL SCORE: 0
HAVE PEOPLE ANNOYED YOU BY CRITICIZING YOUR DRINKING: NO
TOTAL SCORE: 0
EVER HAD A DRINK FIRST THING IN THE MORNING TO STEADY YOUR NERVES TO GET RID OF A HANGOVER: NO
HAVE YOU EVER FELT YOU SHOULD CUT DOWN ON YOUR DRINKING: NO
ALCOHOL_USE: NO

## 2021-07-12 ASSESSMENT — PATIENT HEALTH QUESTIONNAIRE - PHQ9
1. LITTLE INTEREST OR PLEASURE IN DOING THINGS: NOT AT ALL
SUM OF ALL RESPONSES TO PHQ9 QUESTIONS 1 AND 2: 0
2. FEELING DOWN, DEPRESSED, IRRITABLE, OR HOPELESS: NOT AT ALL

## 2021-07-12 NOTE — PROGRESS NOTES
"Labor Progress Note    Daria Fregoso     IUP@39w1d  IOL for AMA  Patient is status post prostaglandin gel last evening and Cytotec at 5 this morning    Subjective:  Patient is in bed without complaints.  She reports mild irregular contractions.  Denies headaches or scotoma.  Denies any nausea vomiting.  Reports no bleeding or leaking reports normal fetal movements..    Objective:   Vitals:    07/11/21 2200 07/11/21 2211 07/12/21 0720   BP:  117/81 116/64   Pulse:  (!) 109 75   Resp:  18 17   Temp:  36.7 °C (98 °F) 36.3 °C (97.4 °F)   TempSrc:  Axillary Temporal   Weight: 101 kg (223 lb) 101 kg (223 lb)    Height: 1.727 m (5' 8\") 1.727 m (5' 8\")      SVE: 1 cm /50%/-3    Fetal heart rate tracing:  Fetal heart rate baseline is 140s with accelerations and no decelerations moderate fetal heart rate variability is present.. On tocometer, contractions were minutes 1-3 irregular.  NST is reactive    Meds:   Epidural : .no  Pitocin: .no    Labs:  Recent Results (from the past 24 hour(s))   Hold Blood Bank Specimen (Not Tested)    Collection Time: 07/11/21 10:30 PM   Result Value Ref Range    Holding Tube - Bb DONE    CBC WITH DIFFERENTIAL    Collection Time: 07/11/21 10:30 PM   Result Value Ref Range    WBC 12.0 (H) 4.8 - 10.8 K/uL    RBC 4.75 4.20 - 5.40 M/uL    Hemoglobin 14.9 12.0 - 16.0 g/dL    Hematocrit 43.6 37.0 - 47.0 %    MCV 91.8 81.4 - 97.8 fL    MCH 31.4 27.0 - 33.0 pg    MCHC 34.2 33.6 - 35.0 g/dL    RDW 46.7 35.9 - 50.0 fL    Platelet Count 234 164 - 446 K/uL    MPV 12.1 9.0 - 12.9 fL    Neutrophils-Polys 69.90 44.00 - 72.00 %    Lymphocytes 22.50 22.00 - 41.00 %    Monocytes 5.20 0.00 - 13.40 %    Eosinophils 1.20 0.00 - 6.90 %    Basophils 0.50 0.00 - 1.80 %    Immature Granulocytes 0.70 0.00 - 0.90 %    Nucleated RBC 0.00 /100 WBC    Neutrophils (Absolute) 8.39 (H) 2.00 - 7.15 K/uL    Lymphs (Absolute) 2.71 1.00 - 4.80 K/uL    Monos (Absolute) 0.63 0.00 - 0.85 K/uL    Eos (Absolute) 0.15 0.00 - 0.51 K/uL "    Baso (Absolute) 0.06 0.00 - 0.12 K/uL    Immature Granulocytes (abs) 0.08 0.00 - 0.11 K/uL    NRBC (Absolute) 0.00 K/uL       Assessment:   IUP@39w1d  Labor State: Not in labor.  Admission for induction of labor due to AMA status  Status post prostaglandin gel followed by Cytotec x1 dose for cervical ripening  NST is reactive    Plan:  We will start Pitocin induction of labor  Induction of labor and plan of care discussed.  I discussed with patient that if she does not respond well to Pitocin that we may need to try a Cook's balloon for cervical ripening.  Pain management options were discussed.      Scottie Harrison M.D.

## 2021-07-12 NOTE — PROGRESS NOTES
2152- EDC 2021 39.0 weeks presents to L&D for IOL for AMA.  TOCO/FM  Applied. Pt very anxious/nervous and tearful. Alaina JIMÉNEZ at bedside to discuss POC with pt and FOB.  SVE=closed/50/-3  IV started, labs drawn and admit profile completed.  2300-prepidil gel placed  0510-SVE=unchanged. cytotec placed.  0700-report given to dayshift RN

## 2021-07-12 NOTE — PROGRESS NOTES
0700- Report received from GRACIE Winters. FOB sleeping at the bedside.  Pt had cytotec placed this morning. Pt is having mild cramping. Pt denies VB or LOF. Pt reports +FM.   TINO Hernadez approves of pt having breakfast.  0750- Pt sitting up eating breakfast.  1100- GRACIE Mcmahon at the bedside with Dr. Harrison, SVE 1/50/-3.  1335- IV pitocin started, per Dr. Harrison.  1640- Dr. Rosario and Dr. Gilmore at the bedside, SVE FT (outer os) (inner os closed)/thick/high, Dr. Rosario. Dr. Gilmore discussed POC to place cervidil and possible place cook's catheter.   1645- pitocin discontinued, per Dr. Gilmore.  Pt allowed to eat, per Dr. Gilmore.  1748- Dr. Rosario and Dr. Gilmore at the bedside, cervidil placed by Dr. Gilmore.  1900- Pt sleeping. Report given to GRACIE Wilkerson.

## 2021-07-12 NOTE — PROGRESS NOTES
Pt resting comfortably after PGE gel placed 2300. D/w pt plan to recheck and possibly place cytotec. Pt happy with plan.     Cervix: Cl/50/-3, soft, 25mcg Cytotec placed now  NST: Cat 1  TOCO: Irreg UCs q 10 min    A/P: IUP at 39w1d - continue IOL, pain control prn, cytotec placed now, anticipate .

## 2021-07-12 NOTE — CARE PLAN
The patient is Stable - Low risk of patient condition declining or worsening         Progress made toward(s) clinical / shift goals:  Reassuring fetal heart tracing obtained      Problem: Knowledge Deficit - L&D  Goal: Patient and family/caregivers will demonstrate understanding of plan of care, disease process/condition, diagnostic tests and medications  Outcome: Progressing     Problem: Psychosocial - L&D  Goal: Patient's level of anxiety will decrease  Outcome: Progressing  Goal: Patient will be able to discuss coping skills during hospitalization  Outcome: Progressing  Goal: Patient's ability to re-evaluate and adapt role responsibilities will improve  Outcome: Progressing  Goal: Spiritual and cultural needs incorporated into hospitalization  Outcome: Progressing     Problem: Risk for Venous Thromboembolism (VTE)  Goal: VTE prevention measures will be implemented and patient will remain free from VTE  Outcome: Progressing     Problem: Risk for Infection and Impaired Wound Healing  Goal: Patient will remain free from infection  Outcome: Progressing  Goal: Patient's wound/surgical incision will decrease in size and heals properly  Outcome: Progressing     Problem: Risk for Fluid Imbalance  Goal: Patient's fluid volume balance will be maintained or improve  Outcome: Progressing     Problem: Risk for Injury  Goal: Patient and fetus will be free of preventable injury/complications  Outcome: Progressing     Problem: Pain  Goal: Patient's pain will be alleviated or reduced to the patient’s comfort goal  Outcome: Progressing     Problem: Discharge Barriers/Planning  Goal: Patient's continuum of care needs are met  Outcome: Progressing

## 2021-07-12 NOTE — ED PROVIDER NOTES
Emergency Obstetric Consultation     Date of Service  2021    Reason for Consultation  No chief complaint on file.      History of Presenting Illness  40 y.o. female who presented 2021 with IOL for AMA.    Review of Systems  ROS    Obstetric History    OB History    Para Term  AB Living   3 0     2 0   SAB TAB Ectopic Molar Multiple Live Births   1 1              # Outcome Date GA Lbr Tristan/2nd Weight Sex Delivery Anes PTL Lv   3 Current            2 TAB 2020           1 2002               Gynecologic History  No LMP recorded. Patient is pregnant.    Medical History  No past medical history on file.    Surgical History   has a past surgical history that includes liposuction; nasal reconstruction; mammoplasty augmentation; and appendectomy.    Family History  family history includes Diabetes in her father; Hyperlipidemia in her father and mother; Hypertension in her father and mother.    Social History   reports that she has never smoked. She has never used smokeless tobacco. She reports previous alcohol use of about 0.5 oz of alcohol per week. She reports that she does not use drugs.    Medications  Medications Prior to Admission   Medication Sig Dispense Refill Last Dose   • acetaminophen (TYLENOL) 325 MG Tab Take 650 mg by mouth every four hours as needed.      • aspirin EC (ECOTRIN) 81 MG Tablet Delayed Response Take 81 mg by mouth every day.      • Prenatal MV-Min-Fe Fum-FA-DHA (PRENATAL 1 PO) Take  by mouth.      • Norgestim-Eth Estrad Triphasic 0.18/0.215/0.25 MG-35 MCG Tab TAKE 1 TABLET DAILY. MUST MAKE APPOINTMENT (Patient not taking: Reported on 2021)          Allergies  No Known Allergies    Physical Exam  Exam   Cervix: Cl/50/-3, soft, vtx  NST: Cat 1 per my read, 140bpm, +accels, no decels, mod variabiltiy  TOCO: No UCs seen    Laboratory               No results for input(s): NTPROBNP in the last 72 hours.         Imaging  None    Assessment & Plan  Assessment &  Plan IUP at 39w0d - IOL for AMA, start cervical ripening with PGE gel, pain control prn, anticipate .        Alaina Fernandez PNilsaA.

## 2021-07-12 NOTE — H&P
History and Physical    Daria Fregoso is a 40 y.o. female  -Para:     Gestational Age:  39w0d  Admitted for:   Induction of Labor  Admitted to  [facility] Delivery.  Patient received prenatal care: [facility]    HPI: Patient is admitted with the above mentioned Chief Complaint and States   Loss of fluid:   negative  Abdominal Pain:  negative  Uterine Contractions:  positive  Vaginal Bleeding:  negative  Fetal Movement:  normal  Patient denies fever, chills, nausea, vomiting , headache, visual disturbance, or dysuria  No LMP recorded. Patient is pregnant.  Estimated Date of Delivery: 21  Final PAPA: 2021, by Ultrasound    Patient Active Problem List    Diagnosis Date Noted   • Supervision of high risk pregnancy in third trimester 2021   • Obesity in pregnancy 2021   • AMA (advanced maternal age) multigravida 35+, second trimester 2021   • Arthralgia of right elbow 2020   • Hyperlipidemia, unspecified 2020   • Arthralgia of hand 10/04/2019       Admitting DX: Indication for care in labor or delivery [O75.9]   Pregnancy Complications:  AMA  OB Risk Factors:   advanced maternal age  Labor State:    Not in labor.    History:   has no past medical history of Addisons disease ([facility]), Adrenal disorder ([facility]), Allergy, Anemia, Anxiety, Arthritis, Asthma, Blood transfusion without reported diagnosis, Cancer ([facility]), Cataract, CHF (congestive heart failure) ([facility]), Clotting disorder ([facility]), COPD (chronic obstructive pulmonary disease) ([facility]), Cushings syndrome ([facility]), Depression, Diabetes ([facility]), Diabetic neuropathy ([facility]), GERD (gastroesophageal reflux disease), Glaucoma, Goiter, Head ache, Heart attack ([facility]), Heart murmur, HIV (human immunodeficiency virus infection) ([facility]), Hypertension, IBD (inflammatory bowel disease), Kidney disease, Meningitis, Migraine, Muscle disorder, Osteoporosis, Parathyroid disorder ([facility]), Pituitary disease ([facility]), Pulmonary emphysema  "(HCC), Seizure (HCC), Sickle cell disease (HCC), Stroke (HCC), Substance abuse (HCC), Thyroid disease, Tuberculosis, or Urinary tract infection.     has a past surgical history that includes liposuction; nasal reconstruction; mammoplasty augmentation; and appendectomy.    OB History    Para Term  AB Living   3 0     2 0   SAB TAB Ectopic Molar Multiple Live Births   1 1              # Outcome Date GA Lbr Tristan/2nd Weight Sex Delivery Anes PTL Lv   3 Current            2 TAB 2020               Medications:  No current facility-administered medications on file prior to encounter.     Current Outpatient Medications on File Prior to Encounter   Medication Sig Dispense Refill   • acetaminophen (TYLENOL) 325 MG Tab Take 650 mg by mouth every four hours as needed.     • aspirin EC (ECOTRIN) 81 MG Tablet Delayed Response Take 81 mg by mouth every day.     • Prenatal MV-Min-Fe Fum-FA-DHA (PRENATAL 1 PO) Take  by mouth.     • Norgestim-Eth Estrad Triphasic 0.18/0.215/0.25 MG-35 MCG Tab TAKE 1 TABLET DAILY. MUST MAKE APPOINTMENT (Patient not taking: Reported on 2021)         Allergies:  Patient has no known allergies.    ROS:   Neuro: negative    Cardiovascular: negative  Gastro intestinal: negative  Genitourinary: negative            Physical Exam:  Ht 1.727 m (5' 8\")   Wt 101 kg (223 lb)   BMI 33.91 kg/m²   Constitutional: healthy-appearing, Well-developed, well-nourished  No JVD: while supine  HEENT: PERRLA  Breast Exam: negative  Cardio: regular rate and rhythm  Lung: unlabored respirations, no intercostal retractions or accessory muscle use  Abdomen: abdomen is soft without significant tenderness, masses, organomegaly or guarding  Extremity: extremities, peripheral pulses and reflexes normal    Cervical Exam: 50%  Cervix Dilatation: Closed  Station: negative 3  Pelvis: Normal  Fetal Assessment: Fetal heart variability: moderate  Fetal Heart Rate decelerations: none  Fetal Heart Rate " accelerations: yes  Baseline FHR: 140 per minute  Uterine contractions: regular, every 3 minutes  Estimated Fetal Weight: 3000 - 3500g      Labs:      Prenatal Results     General (Most Recent Result)     Test Value Date Time    ABO  O  12/14/20 0933    Rh  POS  12/14/20 0933    Antibody screen  NEG  12/14/20 0933    HbA1c       Chlamydia by PCR  Negative  01/14/21 1505    Gonorrhea by PCR  Negative  01/14/21 1505    RPR/Syphilus  Non-Reactive  04/17/21 0814    HSV 1/2 by PCR (non-serum)       HSV 1/2 (serum)       HSV 1       HSV 2       HPV (16)       HBsAg  Non-Reactive  12/14/20 0933    HIV-1 HIV-2 Antibodies  Non-Reactive  12/14/20 0933    Rubella  86.30 IU/mL 12/14/20 0933    Tb             Pap Smear (Most Recent Result)     Test Value Date Time    Pap smear       Pap smear w/HPV       Pap smear w/CTNG       Pap smar w/HPV CTNG       Pap smear (reflex HPV ACUS)       Pap smear (reflex HPV ASCUS w/CTNG)       Pathology gyn specimen             Urinalysis (Most Recent Result)     Test Value Date Time    Urinalysis  (See Report)   05/29/12 0850    POC urinalysis  (See Report)   01/14/21 1437    Urine drug screen (w/o conf)       Urine culture ([id number])       Urine culture ([id number])  (See Report)   12/14/20 0930    Urine Protein/Creatinine Ratio  53 mg/g 12/14/20 0930          Urinalysis, Culture if indicated     Test Value Date Time    Color  DK Yellow  12/14/20 0933    Appearance  Cloudy  12/14/20 0933    Specific Gravity  1.031  12/14/20 0933    PH  7.5  12/14/20 0933    Glucose  Negative mg/dL 12/14/20 0933    Ketones  Trace mg/dL 12/14/20 0933    Protein  30 mg/dL 12/14/20 0933    Bilirubin  Small  12/14/20 0933    Nitrites  Negative  12/14/20 0933    Leukocytes Esterase  Trace  12/14/20 0933    Blood  Negative  12/14/20 0933    Comment  Microscopic  12/14/20 0933    Culture             Urine Drug Screen     Test Value Date Time    Amphetamines       Barbiturates       Benzodiazepines       Cocaine        Methadone       Opiates       Oxycodone       Phencylidine       Propoxyphene       Marijuana Metabolite             1st Trimester     Test Value Date Time    Hgb  16.1 g/dL 20 0933    Hct  48.3 % 20 0933    Fasting Glucose Tolerance       GTT, 1 hour  175 mg/dL 20 1027    GTT, 2 hours  85 mg/dL 20 0745    GTT, 3 hours  36 mg/dL 20 0745          2nd Trimester     Test Value Date Time    Hgb  13.3 g/dL 21 0814    Hct  41.4 % 21 0814    AST       ALT       Uric Acid       Fasting Glucose Tolerance       GTT, 1 hour       GTT, 2 hours  137 mg/dL 21 0708    GTT, 3 hours  80 mg/dL 21 0708          3rd Trimester     Test Value Date Time    Hgb       Hct       Platelet count       GBS (MOLINA BROTH)  Negative  21 0935    Fasting Glucose Tolerance       GTT, 1 hour       GTT, 2 hous       GTT, 3hours             Congenital Disease Screening     Test Value Date Time    First Trimester Screen       Quad Screen       BH Electrophoresis       Cystic Fibrosis Carrier Study       SMA       AFP Maternal Serum        AFP Tetra       NIPT             Legend    ^: Historical                          Assessment:  Gestational Age:  39w0d  Labor State:   Labor, Active  Risk Factors:   advanced maternal age  Pregnancy Complications: None    Patient Active Problem List    Diagnosis Date Noted   • Supervision of high risk pregnancy in third trimester 2021   • Obesity in pregnancy 2021   • AMA (advanced maternal age) multigravida 35+, second trimester 2021   • Arthralgia of right elbow 2020   • Hyperlipidemia, unspecified 2020   • Arthralgia of hand 10/04/2019       Plan:   Admitted for: Induction of Labor for AMA, GBS neg, PGE gel now, pain control prn, anticipate . S/p COVID vaccine (see Immunizations), so no COVID testing needed.       Alaina Fernandez, PNilsaA.

## 2021-07-13 ENCOUNTER — ANESTHESIA EVENT (OUTPATIENT)
Dept: OBGYN | Facility: MEDICAL CENTER | Age: 41
End: 2021-07-13
Payer: COMMERCIAL

## 2021-07-13 ENCOUNTER — ANESTHESIA (OUTPATIENT)
Dept: OBGYN | Facility: MEDICAL CENTER | Age: 41
End: 2021-07-13
Payer: COMMERCIAL

## 2021-07-13 PROCEDURE — 700101 HCHG RX REV CODE 250: Performed by: ANESTHESIOLOGY

## 2021-07-13 PROCEDURE — 700101 HCHG RX REV CODE 250

## 2021-07-13 PROCEDURE — 770002 HCHG ROOM/CARE - OB PRIVATE (112)

## 2021-07-13 PROCEDURE — 59510 CESAREAN DELIVERY: CPT | Performed by: OBSTETRICS & GYNECOLOGY

## 2021-07-13 PROCEDURE — 700102 HCHG RX REV CODE 250 W/ 637 OVERRIDE(OP): Performed by: ANESTHESIOLOGY

## 2021-07-13 PROCEDURE — A9270 NON-COVERED ITEM OR SERVICE: HCPCS | Performed by: ANESTHESIOLOGY

## 2021-07-13 PROCEDURE — 700111 HCHG RX REV CODE 636 W/ 250 OVERRIDE (IP): Performed by: ANESTHESIOLOGY

## 2021-07-13 PROCEDURE — 700111 HCHG RX REV CODE 636 W/ 250 OVERRIDE (IP): Performed by: OBSTETRICS & GYNECOLOGY

## 2021-07-13 PROCEDURE — 303615 HCHG EPIDURAL/SPINAL ANESTHESIA FOR LABOR

## 2021-07-13 PROCEDURE — 700105 HCHG RX REV CODE 258: Performed by: OBSTETRICS & GYNECOLOGY

## 2021-07-13 PROCEDURE — 160041 HCHG SURGERY MINUTES - EA ADDL 1 MIN LEVEL 4: Performed by: OBSTETRICS & GYNECOLOGY

## 2021-07-13 PROCEDURE — 160002 HCHG RECOVERY MINUTES (STAT): Performed by: OBSTETRICS & GYNECOLOGY

## 2021-07-13 PROCEDURE — A9270 NON-COVERED ITEM OR SERVICE: HCPCS | Performed by: OBSTETRICS & GYNECOLOGY

## 2021-07-13 PROCEDURE — 160048 HCHG OR STATISTICAL LEVEL 1-5: Performed by: OBSTETRICS & GYNECOLOGY

## 2021-07-13 PROCEDURE — 700111 HCHG RX REV CODE 636 W/ 250 OVERRIDE (IP): Performed by: PHYSICIAN ASSISTANT

## 2021-07-13 PROCEDURE — 700105 HCHG RX REV CODE 258: Performed by: ANESTHESIOLOGY

## 2021-07-13 PROCEDURE — 700111 HCHG RX REV CODE 636 W/ 250 OVERRIDE (IP)

## 2021-07-13 PROCEDURE — 700102 HCHG RX REV CODE 250 W/ 637 OVERRIDE(OP): Performed by: OBSTETRICS & GYNECOLOGY

## 2021-07-13 PROCEDURE — 160035 HCHG PACU - 1ST 60 MINS PHASE I: Performed by: OBSTETRICS & GYNECOLOGY

## 2021-07-13 PROCEDURE — 59514 CESAREAN DELIVERY ONLY: CPT | Mod: AS | Performed by: NURSE PRACTITIONER

## 2021-07-13 PROCEDURE — 160009 HCHG ANES TIME/MIN: Performed by: OBSTETRICS & GYNECOLOGY

## 2021-07-13 PROCEDURE — 160029 HCHG SURGERY MINUTES - 1ST 30 MINS LEVEL 4: Performed by: OBSTETRICS & GYNECOLOGY

## 2021-07-13 RX ORDER — ACETAMINOPHEN 500 MG
1000 TABLET ORAL EVERY 6 HOURS
Status: DISPENSED | OUTPATIENT
Start: 2021-07-14 | End: 2021-07-14

## 2021-07-13 RX ORDER — ROPIVACAINE HYDROCHLORIDE 2 MG/ML
INJECTION, SOLUTION EPIDURAL; INFILTRATION; PERINEURAL
Status: COMPLETED
Start: 2021-07-13 | End: 2021-07-13

## 2021-07-13 RX ORDER — IBUPROFEN 600 MG/1
600 TABLET ORAL EVERY 6 HOURS PRN
Status: DISCONTINUED | OUTPATIENT
Start: 2021-07-13 | End: 2021-07-14

## 2021-07-13 RX ORDER — OXYCODONE HYDROCHLORIDE AND ACETAMINOPHEN 5; 325 MG/1; MG/1
1 TABLET ORAL EVERY 4 HOURS PRN
Status: DISCONTINUED | OUTPATIENT
Start: 2021-07-13 | End: 2021-07-14

## 2021-07-13 RX ORDER — SODIUM CHLORIDE, SODIUM LACTATE, POTASSIUM CHLORIDE, AND CALCIUM CHLORIDE .6; .31; .03; .02 G/100ML; G/100ML; G/100ML; G/100ML
250 INJECTION, SOLUTION INTRAVENOUS PRN
Status: DISCONTINUED | OUTPATIENT
Start: 2021-07-13 | End: 2021-07-13 | Stop reason: HOSPADM

## 2021-07-13 RX ORDER — ONDANSETRON 2 MG/ML
INJECTION INTRAMUSCULAR; INTRAVENOUS PRN
Status: DISCONTINUED | OUTPATIENT
Start: 2021-07-13 | End: 2021-07-13 | Stop reason: SURG

## 2021-07-13 RX ORDER — OXYCODONE HYDROCHLORIDE 10 MG/1
10 TABLET ORAL EVERY 4 HOURS PRN
Status: ACTIVE | OUTPATIENT
Start: 2021-07-13 | End: 2021-07-14

## 2021-07-13 RX ORDER — DIPHENHYDRAMINE HYDROCHLORIDE 50 MG/ML
12.5 INJECTION INTRAMUSCULAR; INTRAVENOUS EVERY 6 HOURS PRN
Status: ACTIVE | OUTPATIENT
Start: 2021-07-13 | End: 2021-07-14

## 2021-07-13 RX ORDER — LIDOCAINE HYDROCHLORIDE 20 MG/ML
INJECTION, SOLUTION EPIDURAL; INFILTRATION; INTRACAUDAL; PERINEURAL PRN
Status: DISCONTINUED | OUTPATIENT
Start: 2021-07-13 | End: 2021-07-13 | Stop reason: SURG

## 2021-07-13 RX ORDER — ROPIVACAINE HYDROCHLORIDE 2 MG/ML
INJECTION, SOLUTION EPIDURAL; INFILTRATION; PERINEURAL CONTINUOUS
Status: DISCONTINUED | OUTPATIENT
Start: 2021-07-13 | End: 2021-07-14

## 2021-07-13 RX ORDER — ACETAMINOPHEN 325 MG/1
325 TABLET ORAL EVERY 4 HOURS PRN
Status: DISCONTINUED | OUTPATIENT
Start: 2021-07-13 | End: 2021-07-14

## 2021-07-13 RX ORDER — SODIUM CHLORIDE, SODIUM GLUCONATE, SODIUM ACETATE, POTASSIUM CHLORIDE AND MAGNESIUM CHLORIDE 526; 502; 368; 37; 30 MG/100ML; MG/100ML; MG/100ML; MG/100ML; MG/100ML
INJECTION, SOLUTION INTRAVENOUS
Status: DISCONTINUED | OUTPATIENT
Start: 2021-07-13 | End: 2021-07-13 | Stop reason: SURG

## 2021-07-13 RX ORDER — KETOROLAC TROMETHAMINE 30 MG/ML
INJECTION, SOLUTION INTRAMUSCULAR; INTRAVENOUS PRN
Status: DISCONTINUED | OUTPATIENT
Start: 2021-07-13 | End: 2021-07-13 | Stop reason: SURG

## 2021-07-13 RX ORDER — HYDROMORPHONE HYDROCHLORIDE 1 MG/ML
0.4 INJECTION, SOLUTION INTRAMUSCULAR; INTRAVENOUS; SUBCUTANEOUS
Status: ACTIVE | OUTPATIENT
Start: 2021-07-13 | End: 2021-07-14

## 2021-07-13 RX ORDER — HYDROMORPHONE HYDROCHLORIDE 1 MG/ML
0.2 INJECTION, SOLUTION INTRAMUSCULAR; INTRAVENOUS; SUBCUTANEOUS
Status: ACTIVE | OUTPATIENT
Start: 2021-07-13 | End: 2021-07-14

## 2021-07-13 RX ORDER — OXYCODONE HYDROCHLORIDE 5 MG/1
5 TABLET ORAL EVERY 4 HOURS PRN
Status: ACTIVE | OUTPATIENT
Start: 2021-07-13 | End: 2021-07-14

## 2021-07-13 RX ORDER — ONDANSETRON 2 MG/ML
4 INJECTION INTRAMUSCULAR; INTRAVENOUS EVERY 6 HOURS PRN
Status: ACTIVE | OUTPATIENT
Start: 2021-07-13 | End: 2021-07-14

## 2021-07-13 RX ORDER — DEXTROSE, SODIUM CHLORIDE, SODIUM LACTATE, POTASSIUM CHLORIDE, AND CALCIUM CHLORIDE 5; .6; .31; .03; .02 G/100ML; G/100ML; G/100ML; G/100ML; G/100ML
INJECTION, SOLUTION INTRAVENOUS CONTINUOUS
Status: DISCONTINUED | OUTPATIENT
Start: 2021-07-13 | End: 2021-07-14

## 2021-07-13 RX ORDER — SODIUM CHLORIDE, SODIUM GLUCONATE, SODIUM ACETATE, POTASSIUM CHLORIDE AND MAGNESIUM CHLORIDE 526; 502; 368; 37; 30 MG/100ML; MG/100ML; MG/100ML; MG/100ML; MG/100ML
1500 INJECTION, SOLUTION INTRAVENOUS ONCE
Status: COMPLETED | OUTPATIENT
Start: 2021-07-13 | End: 2021-07-13

## 2021-07-13 RX ORDER — METHYLERGONOVINE MALEATE 0.2 MG/ML
INJECTION INTRAVENOUS PRN
Status: DISCONTINUED | OUTPATIENT
Start: 2021-07-13 | End: 2021-07-13 | Stop reason: SURG

## 2021-07-13 RX ORDER — KETOROLAC TROMETHAMINE 30 MG/ML
30 INJECTION, SOLUTION INTRAMUSCULAR; INTRAVENOUS EVERY 6 HOURS
Status: DISPENSED | OUTPATIENT
Start: 2021-07-14 | End: 2021-07-15

## 2021-07-13 RX ORDER — CITRIC ACID/SODIUM CITRATE 334-500MG
SOLUTION, ORAL ORAL
Status: DISCONTINUED
Start: 2021-07-13 | End: 2021-07-14

## 2021-07-13 RX ORDER — CITRIC ACID/SODIUM CITRATE 334-500MG
30 SOLUTION, ORAL ORAL ONCE
Status: COMPLETED | OUTPATIENT
Start: 2021-07-13 | End: 2021-07-13

## 2021-07-13 RX ORDER — AZITHROMYCIN 500 MG/5ML
INJECTION, POWDER, LYOPHILIZED, FOR SOLUTION INTRAVENOUS
Status: DISCONTINUED
Start: 2021-07-13 | End: 2021-07-13

## 2021-07-13 RX ORDER — METOCLOPRAMIDE HYDROCHLORIDE 5 MG/ML
INJECTION INTRAMUSCULAR; INTRAVENOUS
Status: DISCONTINUED
Start: 2021-07-13 | End: 2021-07-14

## 2021-07-13 RX ORDER — DIPHENHYDRAMINE HYDROCHLORIDE 50 MG/ML
25 INJECTION INTRAMUSCULAR; INTRAVENOUS EVERY 6 HOURS PRN
Status: ACTIVE | OUTPATIENT
Start: 2021-07-13 | End: 2021-07-14

## 2021-07-13 RX ORDER — SODIUM CHLORIDE, SODIUM LACTATE, POTASSIUM CHLORIDE, AND CALCIUM CHLORIDE .6; .31; .03; .02 G/100ML; G/100ML; G/100ML; G/100ML
1000 INJECTION, SOLUTION INTRAVENOUS
Status: DISCONTINUED | OUTPATIENT
Start: 2021-07-13 | End: 2021-07-13 | Stop reason: HOSPADM

## 2021-07-13 RX ORDER — METHYLERGONOVINE MALEATE 0.2 MG/ML
INJECTION INTRAVENOUS
Status: COMPLETED
Start: 2021-07-13 | End: 2021-07-13

## 2021-07-13 RX ORDER — LIDOCAINE HYDROCHLORIDE AND EPINEPHRINE 15; 5 MG/ML; UG/ML
INJECTION, SOLUTION EPIDURAL
Status: COMPLETED | OUTPATIENT
Start: 2021-07-13 | End: 2021-07-13

## 2021-07-13 RX ORDER — MORPHINE SULFATE 0.5 MG/ML
INJECTION, SOLUTION EPIDURAL; INTRATHECAL; INTRAVENOUS PRN
Status: DISCONTINUED | OUTPATIENT
Start: 2021-07-13 | End: 2021-07-13 | Stop reason: SURG

## 2021-07-13 RX ORDER — ACETAMINOPHEN 325 MG/1
650 TABLET ORAL EVERY 6 HOURS PRN
Status: DISCONTINUED | OUTPATIENT
Start: 2021-07-13 | End: 2021-07-17 | Stop reason: HOSPADM

## 2021-07-13 RX ORDER — SODIUM CHLORIDE, SODIUM LACTATE, POTASSIUM CHLORIDE, CALCIUM CHLORIDE 600; 310; 30; 20 MG/100ML; MG/100ML; MG/100ML; MG/100ML
INJECTION, SOLUTION INTRAVENOUS CONTINUOUS
Status: ACTIVE | OUTPATIENT
Start: 2021-07-13 | End: 2021-07-13

## 2021-07-13 RX ORDER — LIDOCAINE HCL/EPINEPHRINE/PF 2%-1:200K
VIAL (ML) INJECTION PRN
Status: DISCONTINUED | OUTPATIENT
Start: 2021-07-13 | End: 2021-07-13 | Stop reason: SURG

## 2021-07-13 RX ORDER — METOCLOPRAMIDE HYDROCHLORIDE 5 MG/ML
10 INJECTION INTRAMUSCULAR; INTRAVENOUS ONCE
Status: COMPLETED | OUTPATIENT
Start: 2021-07-13 | End: 2021-07-13

## 2021-07-13 RX ADMIN — ACETAMINOPHEN 650 MG: 325 TABLET, FILM COATED ORAL at 18:50

## 2021-07-13 RX ADMIN — AZITHROMYCIN FOR INJECTION INJECTION, POWDER, LYOPHILIZED, FOR SOLUTION 500 MG: 500 INJECTION INTRAVENOUS at 20:21

## 2021-07-13 RX ADMIN — SODIUM CHLORIDE, SODIUM GLUCONATE, SODIUM ACETATE, POTASSIUM CHLORIDE AND MAGNESIUM CHLORIDE 1000 ML: 526; 502; 368; 37; 30 INJECTION, SOLUTION INTRAVENOUS at 20:10

## 2021-07-13 RX ADMIN — FENTANYL CITRATE 100 MCG: 50 INJECTION, SOLUTION INTRAMUSCULAR; INTRAVENOUS at 20:38

## 2021-07-13 RX ADMIN — EPHEDRINE SULFATE 5 MG: 50 INJECTION, SOLUTION INTRAVENOUS at 02:47

## 2021-07-13 RX ADMIN — MORPHINE SULFATE 1.5 MG: 0.5 INJECTION, SOLUTION EPIDURAL; INTRATHECAL; INTRAVENOUS at 21:40

## 2021-07-13 RX ADMIN — SODIUM CHLORIDE, SODIUM LACTATE, POTASSIUM CHLORIDE, CALCIUM CHLORIDE AND DEXTROSE MONOHYDRATE: 5; 600; 310; 30; 20 INJECTION, SOLUTION INTRAVENOUS at 18:11

## 2021-07-13 RX ADMIN — ONDANSETRON 4 MG: 2 INJECTION INTRAMUSCULAR; INTRAVENOUS at 20:40

## 2021-07-13 RX ADMIN — ROPIVACAINE HYDROCHLORIDE 200 MG: 2 INJECTION, SOLUTION EPIDURAL; INFILTRATION at 02:21

## 2021-07-13 RX ADMIN — ACETAMINOPHEN 650 MG: 325 TABLET, FILM COATED ORAL at 10:48

## 2021-07-13 RX ADMIN — ACETAMINOPHEN 650 MG: 325 TABLET, FILM COATED ORAL at 16:35

## 2021-07-13 RX ADMIN — SODIUM CHLORIDE, SODIUM GLUCONATE, SODIUM ACETATE, POTASSIUM CHLORIDE AND MAGNESIUM CHLORIDE: 526; 502; 368; 37; 30 INJECTION, SOLUTION INTRAVENOUS at 20:35

## 2021-07-13 RX ADMIN — ROPIVACAINE HYDROCHLORIDE: 2 INJECTION, SOLUTION EPIDURAL; INFILTRATION at 19:12

## 2021-07-13 RX ADMIN — KETOROLAC TROMETHAMINE 30 MG: 30 INJECTION, SOLUTION INTRAMUSCULAR at 21:40

## 2021-07-13 RX ADMIN — LIDOCAINE HYDROCHLORIDE,EPINEPHRINE BITARTRATE 3 ML: 15; .005 INJECTION, SOLUTION EPIDURAL; INFILTRATION; INTRACAUDAL; PERINEURAL at 02:17

## 2021-07-13 RX ADMIN — OXYTOCIN 2000 ML/HR: 10 INJECTION, SOLUTION INTRAMUSCULAR; INTRAVENOUS at 21:12

## 2021-07-13 RX ADMIN — ROPIVACAINE HYDROCHLORIDE 200 MG: 2 INJECTION, SOLUTION EPIDURAL; INFILTRATION at 11:30

## 2021-07-13 RX ADMIN — AMPICILLIN SODIUM 2000 MG: 2 INJECTION, POWDER, FOR SOLUTION INTRAMUSCULAR; INTRAVENOUS at 19:14

## 2021-07-13 RX ADMIN — OXYTOCIN 125 ML/HR: 10 INJECTION, SOLUTION INTRAMUSCULAR; INTRAVENOUS at 22:31

## 2021-07-13 RX ADMIN — OXYTOCIN 1000 ML: 10 INJECTION, SOLUTION INTRAMUSCULAR; INTRAVENOUS at 21:11

## 2021-07-13 RX ADMIN — EPHEDRINE SULFATE 10 MG: 50 INJECTION INTRAMUSCULAR; INTRAVENOUS; SUBCUTANEOUS at 20:44

## 2021-07-13 RX ADMIN — LIDOCAINE HYDROCHLORIDE 10 ML: 20 INJECTION, SOLUTION EPIDURAL; INFILTRATION; INTRACAUDAL at 21:36

## 2021-07-13 RX ADMIN — LIDOCAINE HYDROCHLORIDE AND EPINEPHRINE 15 ML: 20; 5 INJECTION, SOLUTION EPIDURAL; INFILTRATION; INTRACAUDAL; PERINEURAL at 20:38

## 2021-07-13 RX ADMIN — GENTAMICIN SULFATE 320 MG: 40 INJECTION, SOLUTION INTRAMUSCULAR; INTRAVENOUS at 19:52

## 2021-07-13 RX ADMIN — PHENYLEPHRINE HYDROCHLORIDE 75 MCG/MIN: 10 INJECTION INTRAVENOUS at 20:38

## 2021-07-13 RX ADMIN — FENTANYL CITRATE 50 MCG: 50 INJECTION, SOLUTION INTRAMUSCULAR; INTRAVENOUS at 21:36

## 2021-07-13 RX ADMIN — BUPIVACAINE HYDROCHLORIDE 10 ML: 2.5 INJECTION, SOLUTION EPIDURAL; INFILTRATION; INTRACAUDAL; PERINEURAL at 02:17

## 2021-07-13 RX ADMIN — FAMOTIDINE 20 MG: 10 INJECTION INTRAVENOUS at 20:21

## 2021-07-13 RX ADMIN — SODIUM BICARBONATE 1.5 ML: 84 INJECTION, SOLUTION INTRAVENOUS at 20:38

## 2021-07-13 RX ADMIN — OXYTOCIN 16 MILLI-UNITS/MIN: 10 INJECTION, SOLUTION INTRAMUSCULAR; INTRAVENOUS at 18:42

## 2021-07-13 RX ADMIN — SODIUM CITRATE AND CITRIC ACID MONOHYDRATE 30 ML: 500; 334 SOLUTION ORAL at 20:21

## 2021-07-13 RX ADMIN — METHYLERGONOVINE MALEATE 200 MCG: 0.2 INJECTION, SOLUTION INTRAMUSCULAR; INTRAVENOUS at 21:15

## 2021-07-13 RX ADMIN — SODIUM BICARBONATE 0.3 ML: 84 INJECTION, SOLUTION INTRAVENOUS at 20:40

## 2021-07-13 RX ADMIN — METOCLOPRAMIDE 10 MG: 5 INJECTION, SOLUTION INTRAMUSCULAR; INTRAVENOUS at 20:21

## 2021-07-13 RX ADMIN — LIDOCAINE HYDROCHLORIDE AND EPINEPHRINE 3 ML: 20; 5 INJECTION, SOLUTION EPIDURAL; INFILTRATION; INTRACAUDAL; PERINEURAL at 20:40

## 2021-07-13 ASSESSMENT — PAIN SCALES - GENERAL: PAIN_LEVEL: 2

## 2021-07-13 NOTE — PROGRESS NOTES
0700- Report received from GRACIE Wilkerson. Pt resting with epidural in place.  1250- SVE 3/80/-2, EDUARDO Olivas RN.  Dr. Torrez updated on pt's status.  1625- Pt having rectal pressure. SVE 4/80/-2, EDUARDO Olivas RN.  Pt vomiting and anxious.   Pt repositioned and encouraged to rest.  1800- Dr. Torrez notified that pt has 100.2F temp and baby tachycardic.  1837- Dr. Torrez placing orders for amp and gent. Orders to administer tylenol.  1900- Report given to GRACIE Beckford.

## 2021-07-13 NOTE — ANESTHESIA PROCEDURE NOTES
Epidural Block    Date/Time: 7/13/2021 2:17 AM  Performed by: Maida Navarrete M.D.  Authorized by: Maida Navarrete M.D.     Patient Location:  OB  Start Time:  7/13/2021 2:17 AM  Reason for Block: labor analgesia    patient identified, IV checked, site marked, risks and benefits discussed, surgical consent, monitors and equipment checked, pre-op evaluation and timeout performed    Patient Position:  Sitting  Prep: ChloraPrep, patient draped and sterile technique    Monitoring:  Blood pressure, continuous pulse oximetry and heart rate  Approach:  Midline  Location:  L3-L4  Injection Technique:  TAYLOR saline  Skin infiltration:  Lidocaine  Strength:  1%  Dose:  3ml  Needle Type:  Tuohy  Needle Gauge:  17 G  Needle Length:  3.5 in  Loss of resistance::  6  Catheter Size:  19 G  Catheter at Skin Depth:  11  Test Dose Result:  Negative

## 2021-07-13 NOTE — ANESTHESIA PREPROCEDURE EVALUATION
Relevant Problems   No relevant active problems       Physical Exam    Airway   Mallampati: II  TM distance: >3 FB  Neck ROM: full       Cardiovascular - normal exam     Dental - normal exam           Pulmonary   Breath sounds clear to auscultation     Abdominal   (+) obese     Neurological - normal exam                 Anesthesia Plan    ASA 2       Plan - epidural   Neuraxial block will be labor analgesia                  Pertinent diagnostic labs and testing reviewed    Informed Consent:    Anesthetic plan and risks discussed with patient.

## 2021-07-13 NOTE — PROGRESS NOTES
1900- Report received from Aleida BOWMAN at this time. POC discussed. Assumed care of pt at this time. Pt is sleeping at this time.   Per MD pt may eat dinner.   0035- RN at bedside due to pt calling out. SROM noted at this time with meconium. Cervidil removed. SVE by RN, see flowsheets.   ADRIANA Dey CNM given update regarding FHT, SVE and SROM. Provider to see pt at bedside.   0054- ADRIANA Dey CNM at bedside.   ADRIANA Dey CNM in department, status update given.   0700- Report given to Aleida BOWMAN POC discussed. Questions answered.

## 2021-07-13 NOTE — PROGRESS NOTES
Labor Progress Note:      S:  Pt reports she is feeling good, uncomfortable with contractions. Labor ball helps.     Patient Vitals for the past 4 hrs:   BP Temp Temp src Pulse Resp   21 1729 122/68 36.6 °C (97.8 °F) Temporal 74 18     Gen: AAO, NAD    SVE: FT/thick/-2    FHT: 125/moderate variability/+accels/No decels  toco: intermittent ctx      A/P: 40 y.o.  @ 39w1d   - labor: latent   - FHT: cat I  - GBS: NEG  - pain: controlled without epidural   - Cervidil placed at 545pm    Zeynep Rosario M.D.

## 2021-07-13 NOTE — PROGRESS NOTES
"S: Patient is laying in bed, breathing and coping well with contractions. Per RN, she had SROM of meconium stained fluid at 0035. Cervadil was removed at this time. She is desiring pain intervention in the form of IV meds and epidural. We discussed fetal heart rate tracing and meconium stained fluid. All questions answered. At this time, we will take care of her pain, then start pitocin.     O: /62   Pulse 88   Temp 36.9 °C (98.4 °F) (Temporal)   Resp 18   Ht 1.727 m (5' 8\")   Wt 101 kg (223 lb)   BMI 33.91 kg/m²            FHTs:  Baseline 150, variability: moderate, accels: present, decels: Some variable decels noted earlier in tracing, but at this time, have resolved        [place]: Contractions q 2-5, palpate moderate to firm        SVE: fingertip/50/-3, soft     A/P:    1.  IUP @ 39w2d  2.  Cat 2 FHTs    3.  Early labor  4.  Pain management now  5.  Anticipate     Hilaria Dey CNM, APRN  "

## 2021-07-13 NOTE — NON-PROVIDER
"S: Patient is 39.1, IOL for AMA. Pregnancy complicated by AMA & obesity. Pt is in bed eating. Unsure if she should have IV pain meds or epidural. Denies LOF, vaginal bleeding, has +FM. Denies HA, blurred vision, n/v.      O: /68   Pulse 74   Temp 36.6 °C (97.8 °F) (Temporal)   Resp 18   Ht 1.727 m (5' 8\")   Wt 101 kg (223 lb)   BMI 33.91 kg/m²            FHTs:  Baseline 125-130, variability: moderate, accels: present, decels:not present        [place]: Contractions q 3-5 minutes        SVE: /-3     A/P:    1.  IUP @ 39.1  2.  Cat 1 FHTs    3.  IV pain medication PRN or epidural  4.  Anticipate     EUGENIA Hays  "

## 2021-07-14 LAB
ERYTHROCYTE [DISTWIDTH] IN BLOOD BY AUTOMATED COUNT: 48.6 FL (ref 35.9–50)
HCT VFR BLD AUTO: 35.5 % (ref 37–47)
HGB BLD-MCNC: 11.8 G/DL (ref 12–16)
MCH RBC QN AUTO: 31.7 PG (ref 27–33)
MCHC RBC AUTO-ENTMCNC: 33.2 G/DL (ref 33.6–35)
MCV RBC AUTO: 95.4 FL (ref 81.4–97.8)
PLATELET # BLD AUTO: 181 K/UL (ref 164–446)
PMV BLD AUTO: 11.7 FL (ref 9–12.9)
RBC # BLD AUTO: 3.72 M/UL (ref 4.2–5.4)
WBC # BLD AUTO: 21.7 K/UL (ref 4.8–10.8)

## 2021-07-14 PROCEDURE — A9270 NON-COVERED ITEM OR SERVICE: HCPCS | Performed by: OBSTETRICS & GYNECOLOGY

## 2021-07-14 PROCEDURE — 700111 HCHG RX REV CODE 636 W/ 250 OVERRIDE (IP): Performed by: ANESTHESIOLOGY

## 2021-07-14 PROCEDURE — 770002 HCHG ROOM/CARE - OB PRIVATE (112)

## 2021-07-14 PROCEDURE — 700111 HCHG RX REV CODE 636 W/ 250 OVERRIDE (IP): Performed by: OBSTETRICS & GYNECOLOGY

## 2021-07-14 PROCEDURE — 700102 HCHG RX REV CODE 250 W/ 637 OVERRIDE(OP): Performed by: OBSTETRICS & GYNECOLOGY

## 2021-07-14 PROCEDURE — 85027 COMPLETE CBC AUTOMATED: CPT

## 2021-07-14 PROCEDURE — 700102 HCHG RX REV CODE 250 W/ 637 OVERRIDE(OP): Performed by: ANESTHESIOLOGY

## 2021-07-14 PROCEDURE — A9270 NON-COVERED ITEM OR SERVICE: HCPCS | Performed by: ANESTHESIOLOGY

## 2021-07-14 PROCEDURE — 700105 HCHG RX REV CODE 258: Performed by: OBSTETRICS & GYNECOLOGY

## 2021-07-14 PROCEDURE — 36415 COLL VENOUS BLD VENIPUNCTURE: CPT

## 2021-07-14 RX ORDER — VITAMIN A ACETATE, BETA CAROTENE, ASCORBIC ACID, CHOLECALCIFEROL, .ALPHA.-TOCOPHEROL ACETATE, DL-, THIAMINE MONONITRATE, RIBOFLAVIN, NIACINAMIDE, PYRIDOXINE HYDROCHLORIDE, FOLIC ACID, CYANOCOBALAMIN, CALCIUM CARBONATE, FERROUS FUMARATE, ZINC OXIDE, CUPRIC OXIDE 3080; 12; 120; 400; 1; 1.84; 3; 20; 22; 920; 25; 200; 27; 10; 2 [IU]/1; UG/1; MG/1; [IU]/1; MG/1; MG/1; MG/1; MG/1; MG/1; [IU]/1; MG/1; MG/1; MG/1; MG/1; MG/1
1 TABLET, FILM COATED ORAL
Status: DISCONTINUED | OUTPATIENT
Start: 2021-07-14 | End: 2021-07-17 | Stop reason: HOSPADM

## 2021-07-14 RX ORDER — SODIUM CHLORIDE, SODIUM LACTATE, POTASSIUM CHLORIDE, CALCIUM CHLORIDE 600; 310; 30; 20 MG/100ML; MG/100ML; MG/100ML; MG/100ML
INJECTION, SOLUTION INTRAVENOUS CONTINUOUS
Status: DISCONTINUED | OUTPATIENT
Start: 2021-07-14 | End: 2021-07-14

## 2021-07-14 RX ORDER — LABETALOL HYDROCHLORIDE 5 MG/ML
5 INJECTION, SOLUTION INTRAVENOUS
Status: DISCONTINUED | OUTPATIENT
Start: 2021-07-14 | End: 2021-07-14

## 2021-07-14 RX ORDER — ACETAMINOPHEN 325 MG/1
325 TABLET ORAL EVERY 4 HOURS PRN
Status: DISCONTINUED | OUTPATIENT
Start: 2021-07-14 | End: 2021-07-14

## 2021-07-14 RX ORDER — DIPHENHYDRAMINE HCL 25 MG
25 TABLET ORAL EVERY 6 HOURS PRN
Status: DISCONTINUED | OUTPATIENT
Start: 2021-07-14 | End: 2021-07-14

## 2021-07-14 RX ORDER — ONDANSETRON 4 MG/1
4 TABLET, ORALLY DISINTEGRATING ORAL EVERY 6 HOURS PRN
Status: DISCONTINUED | OUTPATIENT
Start: 2021-07-14 | End: 2021-07-17 | Stop reason: HOSPADM

## 2021-07-14 RX ORDER — HYDROMORPHONE HYDROCHLORIDE 1 MG/ML
0.1 INJECTION, SOLUTION INTRAMUSCULAR; INTRAVENOUS; SUBCUTANEOUS
Status: DISCONTINUED | OUTPATIENT
Start: 2021-07-14 | End: 2021-07-14

## 2021-07-14 RX ORDER — ONDANSETRON 2 MG/ML
4 INJECTION INTRAMUSCULAR; INTRAVENOUS
Status: DISCONTINUED | OUTPATIENT
Start: 2021-07-14 | End: 2021-07-14

## 2021-07-14 RX ORDER — OXYCODONE HCL 5 MG/5 ML
10 SOLUTION, ORAL ORAL
Status: DISCONTINUED | OUTPATIENT
Start: 2021-07-14 | End: 2021-07-14

## 2021-07-14 RX ORDER — MORPHINE SULFATE 10 MG/ML
4 INJECTION, SOLUTION INTRAMUSCULAR; INTRAVENOUS
Status: DISCONTINUED | OUTPATIENT
Start: 2021-07-14 | End: 2021-07-14

## 2021-07-14 RX ORDER — MORPHINE SULFATE 10 MG/ML
4 INJECTION, SOLUTION INTRAMUSCULAR; INTRAVENOUS
Status: DISCONTINUED | OUTPATIENT
Start: 2021-07-14 | End: 2021-07-17 | Stop reason: HOSPADM

## 2021-07-14 RX ORDER — ONDANSETRON 2 MG/ML
4 INJECTION INTRAMUSCULAR; INTRAVENOUS EVERY 6 HOURS PRN
Status: DISCONTINUED | OUTPATIENT
Start: 2021-07-14 | End: 2021-07-17 | Stop reason: HOSPADM

## 2021-07-14 RX ORDER — OXYCODONE HYDROCHLORIDE 10 MG/1
10 TABLET ORAL EVERY 4 HOURS PRN
Status: DISCONTINUED | OUTPATIENT
Start: 2021-07-14 | End: 2021-07-17 | Stop reason: HOSPADM

## 2021-07-14 RX ORDER — OXYCODONE HYDROCHLORIDE AND ACETAMINOPHEN 5; 325 MG/1; MG/1
1 TABLET ORAL EVERY 4 HOURS PRN
Status: DISCONTINUED | OUTPATIENT
Start: 2021-07-14 | End: 2021-07-14

## 2021-07-14 RX ORDER — DIPHENHYDRAMINE HYDROCHLORIDE 50 MG/ML
12.5 INJECTION INTRAMUSCULAR; INTRAVENOUS
Status: DISCONTINUED | OUTPATIENT
Start: 2021-07-14 | End: 2021-07-14

## 2021-07-14 RX ORDER — SODIUM CHLORIDE, SODIUM LACTATE, POTASSIUM CHLORIDE, CALCIUM CHLORIDE 600; 310; 30; 20 MG/100ML; MG/100ML; MG/100ML; MG/100ML
INJECTION, SOLUTION INTRAVENOUS PRN
Status: DISCONTINUED | OUTPATIENT
Start: 2021-07-14 | End: 2021-07-17 | Stop reason: HOSPADM

## 2021-07-14 RX ORDER — IBUPROFEN 600 MG/1
600 TABLET ORAL EVERY 6 HOURS PRN
Status: DISCONTINUED | OUTPATIENT
Start: 2021-07-14 | End: 2021-07-17 | Stop reason: HOSPADM

## 2021-07-14 RX ORDER — HYDROMORPHONE HYDROCHLORIDE 1 MG/ML
0.2 INJECTION, SOLUTION INTRAMUSCULAR; INTRAVENOUS; SUBCUTANEOUS
Status: DISCONTINUED | OUTPATIENT
Start: 2021-07-14 | End: 2021-07-14

## 2021-07-14 RX ORDER — MIDAZOLAM HYDROCHLORIDE 1 MG/ML
1 INJECTION INTRAMUSCULAR; INTRAVENOUS
Status: DISCONTINUED | OUTPATIENT
Start: 2021-07-14 | End: 2021-07-14

## 2021-07-14 RX ORDER — ONDANSETRON 4 MG/1
4 TABLET, ORALLY DISINTEGRATING ORAL EVERY 6 HOURS PRN
Status: DISCONTINUED | OUTPATIENT
Start: 2021-07-14 | End: 2021-07-14

## 2021-07-14 RX ORDER — HYDROMORPHONE HYDROCHLORIDE 1 MG/ML
0.4 INJECTION, SOLUTION INTRAMUSCULAR; INTRAVENOUS; SUBCUTANEOUS
Status: DISCONTINUED | OUTPATIENT
Start: 2021-07-14 | End: 2021-07-14

## 2021-07-14 RX ORDER — MEPERIDINE HYDROCHLORIDE 25 MG/ML
12.5 INJECTION INTRAMUSCULAR; INTRAVENOUS; SUBCUTANEOUS
Status: DISCONTINUED | OUTPATIENT
Start: 2021-07-14 | End: 2021-07-14

## 2021-07-14 RX ORDER — METOPROLOL TARTRATE 1 MG/ML
1 INJECTION, SOLUTION INTRAVENOUS
Status: DISCONTINUED | OUTPATIENT
Start: 2021-07-14 | End: 2021-07-14

## 2021-07-14 RX ORDER — DOCUSATE SODIUM 100 MG/1
100 CAPSULE, LIQUID FILLED ORAL 2 TIMES DAILY PRN
Status: DISCONTINUED | OUTPATIENT
Start: 2021-07-14 | End: 2021-07-17 | Stop reason: HOSPADM

## 2021-07-14 RX ORDER — ONDANSETRON 2 MG/ML
4 INJECTION INTRAMUSCULAR; INTRAVENOUS EVERY 6 HOURS PRN
Status: DISCONTINUED | OUTPATIENT
Start: 2021-07-14 | End: 2021-07-14

## 2021-07-14 RX ORDER — DIPHENHYDRAMINE HYDROCHLORIDE 50 MG/ML
25 INJECTION INTRAMUSCULAR; INTRAVENOUS EVERY 6 HOURS PRN
Status: DISCONTINUED | OUTPATIENT
Start: 2021-07-14 | End: 2021-07-14

## 2021-07-14 RX ORDER — METHYLERGONOVINE MALEATE 0.2 MG/ML
0.2 INJECTION INTRAVENOUS
Status: DISCONTINUED | OUTPATIENT
Start: 2021-07-14 | End: 2021-07-17 | Stop reason: HOSPADM

## 2021-07-14 RX ORDER — OXYCODONE HCL 5 MG/5 ML
5 SOLUTION, ORAL ORAL
Status: DISCONTINUED | OUTPATIENT
Start: 2021-07-14 | End: 2021-07-14

## 2021-07-14 RX ORDER — DIPHENHYDRAMINE HYDROCHLORIDE 50 MG/ML
25 INJECTION INTRAMUSCULAR; INTRAVENOUS EVERY 6 HOURS PRN
Status: DISCONTINUED | OUTPATIENT
Start: 2021-07-14 | End: 2021-07-17 | Stop reason: HOSPADM

## 2021-07-14 RX ORDER — OXYCODONE HYDROCHLORIDE AND ACETAMINOPHEN 5; 325 MG/1; MG/1
1 TABLET ORAL EVERY 4 HOURS PRN
Status: DISCONTINUED | OUTPATIENT
Start: 2021-07-14 | End: 2021-07-17 | Stop reason: HOSPADM

## 2021-07-14 RX ORDER — DIPHENHYDRAMINE HCL 25 MG
25 TABLET ORAL EVERY 6 HOURS PRN
Status: DISCONTINUED | OUTPATIENT
Start: 2021-07-14 | End: 2021-07-17 | Stop reason: HOSPADM

## 2021-07-14 RX ORDER — MISOPROSTOL 200 UG/1
600 TABLET ORAL
Status: DISCONTINUED | OUTPATIENT
Start: 2021-07-14 | End: 2021-07-17 | Stop reason: HOSPADM

## 2021-07-14 RX ORDER — HYDRALAZINE HYDROCHLORIDE 20 MG/ML
5 INJECTION INTRAMUSCULAR; INTRAVENOUS
Status: DISCONTINUED | OUTPATIENT
Start: 2021-07-14 | End: 2021-07-14

## 2021-07-14 RX ORDER — HALOPERIDOL 5 MG/ML
1 INJECTION INTRAMUSCULAR
Status: DISCONTINUED | OUTPATIENT
Start: 2021-07-14 | End: 2021-07-14

## 2021-07-14 RX ORDER — SIMETHICONE 80 MG
80 TABLET,CHEWABLE ORAL 4 TIMES DAILY PRN
Status: DISCONTINUED | OUTPATIENT
Start: 2021-07-14 | End: 2021-07-17 | Stop reason: HOSPADM

## 2021-07-14 RX ORDER — OXYCODONE HYDROCHLORIDE 10 MG/1
10 TABLET ORAL EVERY 4 HOURS PRN
Status: DISCONTINUED | OUTPATIENT
Start: 2021-07-14 | End: 2021-07-14

## 2021-07-14 RX ORDER — IBUPROFEN 600 MG/1
600 TABLET ORAL EVERY 6 HOURS PRN
Status: DISCONTINUED | OUTPATIENT
Start: 2021-07-14 | End: 2021-07-14

## 2021-07-14 RX ORDER — ACETAMINOPHEN 325 MG/1
325 TABLET ORAL EVERY 4 HOURS PRN
Status: DISCONTINUED | OUTPATIENT
Start: 2021-07-14 | End: 2021-07-17 | Stop reason: HOSPADM

## 2021-07-14 RX ADMIN — ACETAMINOPHEN 1000 MG: 500 TABLET ORAL at 08:00

## 2021-07-14 RX ADMIN — AMPICILLIN SODIUM 2000 MG: 2 INJECTION, POWDER, FOR SOLUTION INTRAMUSCULAR; INTRAVENOUS at 20:55

## 2021-07-14 RX ADMIN — ACETAMINOPHEN 1000 MG: 500 TABLET ORAL at 13:56

## 2021-07-14 RX ADMIN — KETOROLAC TROMETHAMINE 30 MG: 30 INJECTION, SOLUTION INTRAMUSCULAR; INTRAVENOUS at 10:18

## 2021-07-14 RX ADMIN — PRENATAL WITH FERROUS FUM AND FOLIC ACID 1 TABLET: 3080; 920; 120; 400; 22; 1.84; 3; 20; 10; 1; 12; 200; 27; 25; 2 TABLET ORAL at 08:00

## 2021-07-14 RX ADMIN — ACETAMINOPHEN 650 MG: 325 TABLET, FILM COATED ORAL at 21:04

## 2021-07-14 RX ADMIN — ACETAMINOPHEN 1000 MG: 500 TABLET ORAL at 02:38

## 2021-07-14 RX ADMIN — GENTAMICIN SULFATE 410 MG: 40 INJECTION, SOLUTION INTRAMUSCULAR; INTRAVENOUS at 22:35

## 2021-07-14 RX ADMIN — AMPICILLIN SODIUM 2000 MG: 2 INJECTION, POWDER, FOR SOLUTION INTRAMUSCULAR; INTRAVENOUS at 08:14

## 2021-07-14 RX ADMIN — KETOROLAC TROMETHAMINE 30 MG: 30 INJECTION, SOLUTION INTRAMUSCULAR; INTRAVENOUS at 16:00

## 2021-07-14 RX ADMIN — KETOROLAC TROMETHAMINE 30 MG: 30 INJECTION, SOLUTION INTRAMUSCULAR; INTRAVENOUS at 22:35

## 2021-07-14 RX ADMIN — AMPICILLIN SODIUM 2000 MG: 2 INJECTION, POWDER, FOR SOLUTION INTRAMUSCULAR; INTRAVENOUS at 02:25

## 2021-07-14 RX ADMIN — AMPICILLIN SODIUM 2000 MG: 2 INJECTION, POWDER, FOR SOLUTION INTRAMUSCULAR; INTRAVENOUS at 14:02

## 2021-07-14 ASSESSMENT — PAIN DESCRIPTION - PAIN TYPE
TYPE: ACUTE PAIN;SURGICAL PAIN
TYPE: SURGICAL PAIN

## 2021-07-14 NOTE — CARE PLAN
The patient is Stable - Low risk of patient condition declining or worsening    Shift Goals  Clinical Goals: ambulating. self care. vss    Progress made toward(s) clinical / shift goals:  ambulating after breakfast. . Lochia light vss no signs or symptoms of infection noted.     Patient is not progressing towards the following goals:

## 2021-07-14 NOTE — PROGRESS NOTES
POSTOPERATIVE  SECTION PROGRESS NOTE;        PATIENT ID:  NAME:  Daria Fregoso  MRN:               [id number]  YOB: 1980       40 y.o. female  at 39w2d POD#1 s/p primary  section/chorioamnionitis      Subjective: Doing well    Objective:    Vitals:    21 0100 21 0200 21 0300 21 0400   BP:  (!) 98/56     Pulse: 91 95 92 95   Resp: 18 18 18 17   Temp:  36.9 °C (98.5 °F)     TempSrc:  Temporal     SpO2: 96% 97% 95% 95%   Weight:       Height:         General: No acute distress, resting comfortably in bed.  HEENT: normocephalic, nontraumatic, PERRLA, EOMI  Cardiovascular: Heart RRR with no murmurs, rubs or gallops. Distal Pulses 2+  Respiratory: symmetric chest expansion, lungs CTA bilaterally with no wheezes rales or rhonci  Abdomen: soft, mildly tender around incision which is clean, dry and intact, fundus firm, +BS  Genitourinary: lochia light, denies excessive vaginal bleeding  Musculoskeletal: strength 5/5 in four extremities  Neuro: non focal with no numbness, tingling or changes in sensation      Recent Labs     21  2230   WBC 12.0*   RBC 4.75   HEMOGLOBIN 14.9   HEMATOCRIT 43.6   MCV 91.8   MCH 31.4   RDW 46.7   PLATELETCT 234   MPV 12.1   NEUTSPOLYS 69.90   LYMPHOCYTES 22.50   MONOCYTES 5.20   EOSINOPHILS 1.20   BASOPHILS 0.50     No results for input(s): SODIUM, POTASSIUM, CHLORIDE, CO2, GLUCOSE, BUN, CPKTOTAL in the last 72 hours.    Current Meds:   Current Facility-Administered Medications   Medication Dose Frequency Provider Last Rate Last Admin   • LR infusion   PRN Pepe Torrez M.D.       • PRN oxytocin (PITOCIN) (20 Units/1000 mL) PRN for excessive uterine bleeding - See Admin Instr  125-999 mL/hr Once PRN Pepe Torrez M.D.       • miSOPROStol (CYTOTEC) tablet 600 mcg  600 mcg Once PRN Pepe Torrez M.D.       • methylergonovine (METHERGINE) injection 0.2 mg  0.2 mg Once PRN Pepe Torrez M.D.       • docusate sodium (COLACE) capsule  100 mg  100 mg BID PRN Pepe Torrez M.D.       • simethicone (MYLICON) chewable tab 80 mg  80 mg 4X/DAY PRN Pepe Torrez M.D.       • prenatal plus vitamin (STUARTNATAL 1+1) 27-1 MG tablet 1 tablet  1 tablet Daily-0800 Pepe Torrez M.D.       • morphine (pf) 10 mg/mL injection 4 mg  4 mg Q3HRS PRN Kathy Barraza D.O.       • diphenhydrAMINE (BENADRYL) tablet/capsule 25 mg  25 mg Q6HRS PRN Kathy Barraza D.O.        Or   • diphenhydrAMINE (BENADRYL) injection 25 mg  25 mg Q6HRS PRN Kathy Barraza D.O.       • acetaminophen (Tylenol) tablet 325 mg  325 mg Q4HRS PRN Kathy Barraza D.O.       • ondansetron (ZOFRAN) syringe/vial injection 4 mg  4 mg Q6HRS PRN Kathy Barraza D.O.        Or   • ondansetron (ZOFRAN ODT) dispertab 4 mg  4 mg Q6HRS PRN Kathy Barraza D.O.       • oxyCODONE immediate release (ROXICODONE) tablet 10 mg  10 mg Q4HRS PRN Kathy Barraza D.O.       • oxyCODONE-acetaminophen (PERCOCET) 5-325 MG per tablet 1 tablet  1 tablet Q4HRS PRN Kathy Barraza D.O.       • ibuprofen (MOTRIN) tablet 600 mg  600 mg Q6HRS PRN Kathy Barraza D.O.       • acetaminophen (Tylenol) tablet 650 mg  650 mg Q6HRS PRN Peep Torrez M.D.   650 mg at 07/13/21 1850   • ampicillin (OMNIPEN) 2,000 mg in  mL IVPB  2,000 mg Q6HRS Pepe Torrez M.D.   Stopped at 07/14/21 0255   • ketorolac (TORADOL) injection 30 mg  30 mg Q6HR Mike Esquivel D.O.       • acetaminophen (TYLENOL) tablet 1,000 mg  1,000 mg Q6HR Mike Esquivel D.O.   1,000 mg at 07/14/21 0238   • oxyCODONE immediate-release (ROXICODONE) tablet 5 mg  5 mg Q4HRS PRN CASANDRA CraftO.       • oxyCODONE immediate release (ROXICODONE) tablet 10 mg  10 mg Q4HRS PRN CASANDRA CraftO.       • HYDROmorphone (Dilaudid) injection 0.2 mg  0.2 mg Q2HRS PRN Mike Esquivel D.O.       • HYDROmorphone (Dilaudid) injection 0.4 mg  0.4 mg Q2HRS PRN Mike Esquivel D.O.       • ePHEDrine injection 10 mg  10 mg Q5 MIN  PRN Mike Esquivel D.O.       • diphenhydrAMINE (BENADRYL) injection 12.5 mg  12.5 mg Q6HRS PRN DANIE Craft.O.        Or   • diphenhydrAMINE (BENADRYL) injection 25 mg  25 mg Q6HRS PRN Mike Esquivel D.O.        Or   • naloxone (NARCAN) 0.4 mg in NS 1,000 mL infusion  0.4 mg PRN Mike Esquivel D.O.       • ondansetron (ZOFRAN) syringe/vial injection 4 mg  4 mg Q6HRS PRN Mike Esquivel D.O.       • diphenhydrAMINE (BENADRYL) injection 12.5 mg  12.5 mg Q6HRS PRN Mike Esquivel D.O.       • naloxone (NARCAN) 0.4 mg in NS 1,000 mL infusion  0.25 mcg/kg/hr PRN DANIE Craft.O.       • gentamicin (GARAMYCIN) 410 mg in  mL IVPB  5 mg/kg (Order-Specific) Q24HR Pepe Torrez M.D.       • MD Alert...Gentamicin per Pharmacy   PHARMACY TO DOSE Pepe Torrez M.D.       Last reviewed on 2021  8:49 PM by Analy Marie R.N.          Assessment:  40 y.o. female  at 39w2d POD#1 s/p primary  section prolonged induction of labor/chorioamnionitis/endometritis    Plan:   1. Continue ampicillin gentamicin  2. Discharge to home POD 3 if stable    Pepe Torrez MD

## 2021-07-14 NOTE — FLOWSHEET NOTE
1900: Report given by GRACIE Shin. POC discussed. Cares assumed.   1940: CORDELIA Francis at BS. Cervical exam of 4/80/-2 with swelling noted by CNM. Pitocin off by RN per TELLOM.   2000: C/S called by MD Lore secondary to fetal intolerance of labor and maternal failure to progress. Pt. Consented at BS by MD.   2035: Pt. In OR 1 for PLTCS.   2111: Delivery of vigorous male infant   2112: Delivery of intact placenta   2211: Pt. In stable condition in PACU. Recovery initiated.   2310: Perineum cleansed with warm water, pad change, chux change.   2320: Pt. And infant Transferred from PACU to PP, both in stable condition. BSR given, and bands checked with PP RN. All cares relinquished.

## 2021-07-14 NOTE — ANESTHESIA POSTPROCEDURE EVALUATION
Patient: Daria Fregoso    Procedure Summary     Date: 21 Room / Location: [facility] OR 01 / SURGERY LABOR AND DELIVERY    Anesthesia Start: 203 Anesthesia Stop:     Procedure:  SECTION, PRIMARY (Bilateral Abdomen) Diagnosis:       Delivery by  section at 37-39 weeks of gestation due to labor      (failure to progress, fetal intolerance, Suspected chorioamnionitis)    Surgeons: Pepe Torrez M.D. Responsible Provider: Mike Esquivel D.O.    Anesthesia Type: epidural ASA Status: 2 - Emergent          Final Anesthesia Type: epidural  Last vitals  BP   Blood Pressure: 115/57    Temp   36.4 °C (97.5 °F)    Pulse   90   Resp   15    SpO2   97 %      Anesthesia Post Evaluation    Patient location during evaluation: PACU  Patient participation: complete - patient participated  Level of consciousness: awake and alert  Pain score: 2    Airway patency: patent  Anesthetic complications: no  Cardiovascular status: hemodynamically stable  Respiratory status: acceptable  Hydration status: euvolemic    PONV: none          No complications documented.     Nurse Pain Score: 3 (NPRS)

## 2021-07-14 NOTE — PROGRESS NOTES
Cervix 4 cm dilated/80% effaced/-2 station per last RN exam    Temperature 100.2 °F    Patient started on Tylenol and ampicillin/gentamicin    Patient has had significant change since this morning when she was less than 1 cm dilated and 50% effaced    Fetal heart rate tracing category 2

## 2021-07-14 NOTE — LACTATION NOTE
This note was copied from a baby's chart.  Baby 39.2 weeks, , MOB Hx Breast augmentation- nipples removed, AMA, + breast changes during pregnancy. Mother has baby on right breast using football hold, observed coordinated sucks. Baby's latch could be deeper, baby removed then re-latched by LC- see latch assessment score.     Teaching on hunger cues, breastfeeding when baby shows cues, breastfeed a minimum 8x/24 hours no longer than 4 hours from last feed, importance of STS, positioning baby at breast nipple to nose & cluster feeding is normal behavior. Mother watched [employer] Hand expression & First droplets video's. Mother motivated to breastfeed. LC provided hand expression demo- unable to express drop. Discussed with mother concern of nipples removed & replaced, with milk ducts. Encouraged mother to hand express & spoon feed back 5 times during day in addition to breastfeeding until milk supply comes in, spoons provided.    Breastfeeding Support & Zoom information sheets provided. Mother understands she can call and make OP lactation appointment with [facility] as needed once discharged.     Breastfeeding plan:  Breastfeed on cue a minimum 8x/24 hours no longer than 4 hours from last feed. Hand express & spoon feed back in addition to breastfeeding until milk supply comes in.

## 2021-07-14 NOTE — ANESTHESIA PREPROCEDURE EVALUATION
@ 39w2d, IUP, Peña  Functioning epidural in place  Fetal Intolerance to Labor, proceed to C/S  Relevant Problems   No relevant active problems       Physical Exam    Airway   Mallampati: II  TM distance: >3 FB  Neck ROM: full       Cardiovascular - normal exam  Rhythm: regular  Rate: normal  (-) murmur     Dental - normal exam           Pulmonary - normal exam  Breath sounds clear to auscultation     Abdominal    Neurological - normal exam                 Anesthesia Plan    ASA 2- EMERGENT   ASA physical status emergent criteria: non-reassuring fetal heart tones    Plan - epidural   Neuraxial block will be primary anesthetic                Postoperative Plan: Postoperative administration of opioids is intended.    Pertinent diagnostic labs and testing reviewed    Informed Consent:    Anesthetic plan and risks discussed with patient.

## 2021-07-14 NOTE — ANESTHESIA TIME REPORT
Anesthesia Start and Stop Event Times     Date Time Event    2021 0203 Ready for Procedure     0203 Anesthesia Start     2216 Anesthesia Stop        Responsible Staff  21    Name Role Begin End    Maida Navarrete M.D. Anesth 0203 0700    Mike Esquivel D.O. Anesth 0700 2216        Preop Diagnosis (Free Text):  Pre-op Diagnosis     failure to progress, fetal intolerance, Suspected chorioamnionitis        Preop Diagnosis (Codes):  Diagnosis Information     Diagnosis Code(s): Delivery by  section at 37-39 weeks of gestation due to labor [O75.82]        Post op Diagnosis  Delivery by emergency       Premium Reason  A. 3PM - 7AM    Comments: To OR for C/S @

## 2021-07-14 NOTE — CARE PLAN
The patient is Stable - Low risk of patient condition declining or worsening         Progress made toward(s) clinical / shift goals:      Problem: Knowledge Deficit - L&D  Goal: Patient and family/caregivers will demonstrate understanding of plan of care, disease process/condition, diagnostic tests and medications  Outcome: Progressing  Note: Pt. Educated on POC, labor progress, swelling, meconium, suspected chorioamnionitis. Questions answered. Pt. Verbalizes understanding.       Problem: Risk for Injury  Goal: Patient and fetus will be free of preventable injury/complications  Outcome: Progressing  Note: Pt. And fetus to remain injury free during admission. Safety protocols discussed with pt. And family. Pt. Agrees to hospital safety protocols at this time.

## 2021-07-14 NOTE — PROGRESS NOTES
Received patient from labor and delivery via [facility] with Analy BOWMAN.  Assessment done. Fundus firm. Lochia scant to light. Instructed patient to increase fluid intake. Patient denies pain at this time. Reviewed POC for this evening. Call light within reach. Will continue to monitor VS.

## 2021-07-14 NOTE — CARE PLAN
The patient is Watcher - Medium risk of patient condition declining or worsening    Shift Goals urine output clear, yellow/ less galileo colored  Clinical Goals: maintain stable VS    Progress made toward(s) clinical / shift goals:  UOP adequate, but remains dark straw to galileo, being monitored closely  VSS post DuraMorph epidural, pt states adequate pain control at this time despite holding Toradol dose due to UOP concerns  Eager to learn care of infant and breastfeeding.  Managing well      Problem: Knowledge Deficit - Postpartum  Goal: Patient will verbalize and demonstrate understanding of self and infant care  Outcome: Progressing     Problem: Altered Physiologic Condition  Goal: Patient physiologically stable as evidenced by normal lochia, palpable uterine involution and vitals within normal limits  Outcome: Progressing

## 2021-07-14 NOTE — PROGRESS NOTES
S: Pt is feeling some pressure with UCs.    O:    Vitals:    07/13/21 1714 07/13/21 1745 07/13/21 1755 07/13/21 1814   BP: 104/64 105/57  (!) 98/51   Pulse: 76 80  77   Resp:       Temp:   37.9 °C (100.2 °F)    TempSrc:   Temporal    Weight:       Height:               FHTs:  Baseline 145, + accels, - decels, mod variability        Phillips: 2 contractions in 10 minutes, mod to palpation, pitocin @ 16 milliunits        SVE: 4/80/-2 per last RN exam.    A/P:    1.  IUP @ 39w2d   2.  Cat 1 FHTs    3.  IOL for AMA - continue pitocin per protocol.  4.  Chorioamnionitis - continue ampicillin, gentamicin and tylenol.  5.  Position changes.    Zeynep Francis, CNM, APN  Dr. Torrez -- attending physician

## 2021-07-14 NOTE — OP REPORT
DATE OF SERVICE:  2021        PREOPERATIVE DIAGNOSES:  Intrauterine pregnancy at 39+ weeks with arrest of   dilatation secondary to cephalopelvic disproportion and late decelerations -   nonreassuring fetal heart rate tracing.     POSTOPERATIVE DIAGNOSES:   Intrauterine pregnancy at 39+ weeks with arrest of   dilatation secondary to cephalopelvic disproportion and late decelerations -   nonreassuring fetal heart rate tracing.     SURGEON:  Pepe Torrez M.D.     ASSISTANT:  Zeynep Francis CNM     ANESTHESIA:  Epidural.     ANESTHESIOLOGIST:  Dr. Mike Esquivel     PROCEDURE:  Primary low transverse uterine  section.     COMPLICATIONS:  None.     SPECIMENS:  Cord gas sent to pathology department.     ESTIMATED BLOOD LOSS:  800 mL.     DRAINS:  Barrera catheter.     INDICATIONS:  This patient is a 40-year-old female  3, para 0 at 39+   weeks.  She was admitted for induction of labor secondary to advanced maternal   age.  The patient had a very unfavorable cervix and she has been through a   multitude of ripening cervical agents, finalized by Pitocin.  She dilated to 4   cm, 80% effacement without further dilatation despite adequate labor, she had   spontaneous rupture of the membranes at approximately 01:30 hours on the , developed signs of early chorioamnionitis and was started on   ampicillin and gentamicin.  The patient developed late decelerations and   decreased variability of the fetal heart rate tracing.     FINDINGS:  Cephalic presentation, clear amniotic fluid.  Apgar scores 8 and 8.    Cord gas results are pending at time of this dictation.  Normal pelvis.     DESCRIPTION OF PROCEDURE:  After adequately being counseled, the patient was   taken to the operating room and placed in supine position.  Epidural   anesthesia was dosed.  She was prepped and draped in the usual sterile   fashion.  Pfannenstiel skin incision was made with a scalpel, taken down to   the fascia.   The fascia was incised with a knife.  Fascial incision taken   laterally on both sides with Garcia scissors.  Rectus fascia dissected off the   underlying rectus muscles both superiorly and inferiorly.  Rectus muscles were   split in the midline.  Peritoneal cavity was entered bluntly with digits and   the peritoneal incision taken superiorly and inferiorly with Metzenbaum   scissors.  Bladder blade placed over the bladder.  Next, a bladder flap was   developed sharply and a bladder blade was placed in the bladder.  Next, a low   transverse uterine incision was made with a scalpel and the infant was   delivered, bulb suctioned.  Umbilical cord clamped and cut.  It was a nuchal   cord x2.  Segment of umbilical cord was given to nurses to draw cord gases.    Placenta was removed from the uterus.  Uterine cavity was cleansed with a   moist laparotomy sponge.  Uterus was exteriorized.  Uterine incision was   closed in two layers using 0 Vicryl in a running locked fashion.  Second layer   was an imbricating layer.  Uterus was returned to the abdominal pelvic cavity   and the pelvis was irrigated and suctioned slightly oozing from the serosa.    Surgicel SNoW was placed over the hysterotomy site and the peritoneal lining   was closed using running nonlocked stitch of 0 Vicryl.  Rectus muscles were   reapproximated in the midline using interrupted stitch of 0 chromic and the   rectus fascia closed using running nonlocked stitch of 0 Vicryl.  Subcutaneous   tissue was irrigated and suctioned and electrocautery used for hemostasis.    Several subcuticular stitches of 0 Vicryl were used to reapproximate the   subcutaneous tissues.  Skin was closed using surgical staples and a pressure   dressing was applied.  The patient was taken to recovery room in good   condition.  No complications noted.              ______________________________  TON CHAVEZ MD    SEJ/SUP    DD:  07/13/2021 22:40  DT:  07/13/2021 23:06    Job#:   [id number]

## 2021-07-14 NOTE — PROGRESS NOTES
Cervix reexamined by Zeynep Francis CNM-no change over the last 2 to 3 hours-also noted to have significant swelling of the cervix and labia.    Patient having persistent late decelerations at this point-Pitocin turned off    Patient has started ampicillin x1 dose and gentamicin x1 dose due to elevated temperature with suspicion of chorioamnionitis.    Due to nonreassuring fetal heart rate tracing and lack of cervical change recommend primary  section    Patient seems very ambivalent about permanent sterilization-initially there was some mention that she might want it but upon further questioning she seems very unsure.  We discussed this in extensive detail and I do not recommend permanent sterilization unless she is sure about permanent sterilization.    Preoperative counseling performed;  Discussed the risks of pain bleeding infection damage to any or all internal organs including but not limited to bowel intestines bladder uterus tubes ovaries nerves blood vessels skin and baby possible wound infection possible uterine infection possible blood transfusion with inherent risk of AIDS and hepatitis.  Patient is already developing intrauterine infection and we will continue her on antibiotics postoperatively  All questions answered in detail.

## 2021-07-15 LAB
BASOPHILS # BLD AUTO: 0.4 % (ref 0–1.8)
BASOPHILS # BLD: 0.06 K/UL (ref 0–0.12)
EOSINOPHIL # BLD AUTO: 0.32 K/UL (ref 0–0.51)
EOSINOPHIL NFR BLD: 2.1 % (ref 0–6.9)
ERYTHROCYTE [DISTWIDTH] IN BLOOD BY AUTOMATED COUNT: 48.2 FL (ref 35.9–50)
HCT VFR BLD AUTO: 28.8 % (ref 37–47)
HGB BLD-MCNC: 9.7 G/DL (ref 12–16)
IMM GRANULOCYTES # BLD AUTO: 0.1 K/UL (ref 0–0.11)
IMM GRANULOCYTES NFR BLD AUTO: 0.6 % (ref 0–0.9)
LYMPHOCYTES # BLD AUTO: 2.83 K/UL (ref 1–4.8)
LYMPHOCYTES NFR BLD: 18.4 % (ref 22–41)
MCH RBC QN AUTO: 31.7 PG (ref 27–33)
MCHC RBC AUTO-ENTMCNC: 33.7 G/DL (ref 33.6–35)
MCV RBC AUTO: 94.1 FL (ref 81.4–97.8)
MONOCYTES # BLD AUTO: 0.83 K/UL (ref 0–0.85)
MONOCYTES NFR BLD AUTO: 5.4 % (ref 0–13.4)
NEUTROPHILS # BLD AUTO: 11.25 K/UL (ref 2–7.15)
NEUTROPHILS NFR BLD: 73.1 % (ref 44–72)
NRBC # BLD AUTO: 0 K/UL
NRBC BLD-RTO: 0 /100 WBC
PLATELET # BLD AUTO: 206 K/UL (ref 164–446)
PMV BLD AUTO: 11.7 FL (ref 9–12.9)
RBC # BLD AUTO: 3.06 M/UL (ref 4.2–5.4)
WBC # BLD AUTO: 15.4 K/UL (ref 4.8–10.8)

## 2021-07-15 PROCEDURE — 770002 HCHG ROOM/CARE - OB PRIVATE (112)

## 2021-07-15 PROCEDURE — 700102 HCHG RX REV CODE 250 W/ 637 OVERRIDE(OP): Performed by: OBSTETRICS & GYNECOLOGY

## 2021-07-15 PROCEDURE — A9270 NON-COVERED ITEM OR SERVICE: HCPCS | Performed by: OBSTETRICS & GYNECOLOGY

## 2021-07-15 PROCEDURE — 85025 COMPLETE CBC W/AUTO DIFF WBC: CPT

## 2021-07-15 PROCEDURE — 700105 HCHG RX REV CODE 258: Performed by: OBSTETRICS & GYNECOLOGY

## 2021-07-15 PROCEDURE — 700111 HCHG RX REV CODE 636 W/ 250 OVERRIDE (IP): Performed by: OBSTETRICS & GYNECOLOGY

## 2021-07-15 PROCEDURE — 36415 COLL VENOUS BLD VENIPUNCTURE: CPT

## 2021-07-15 RX ADMIN — IBUPROFEN 600 MG: 600 TABLET, FILM COATED ORAL at 09:33

## 2021-07-15 RX ADMIN — PRENATAL WITH FERROUS FUM AND FOLIC ACID 1 TABLET: 3080; 920; 120; 400; 22; 1.84; 3; 20; 10; 1; 12; 200; 27; 25; 2 TABLET ORAL at 09:33

## 2021-07-15 RX ADMIN — IBUPROFEN 600 MG: 600 TABLET, FILM COATED ORAL at 18:49

## 2021-07-15 RX ADMIN — AMPICILLIN SODIUM 2000 MG: 2 INJECTION, POWDER, FOR SOLUTION INTRAMUSCULAR; INTRAVENOUS at 02:05

## 2021-07-15 RX ADMIN — ACETAMINOPHEN 650 MG: 325 TABLET, FILM COATED ORAL at 18:49

## 2021-07-15 ASSESSMENT — PAIN DESCRIPTION - PAIN TYPE
TYPE: ACUTE PAIN

## 2021-07-15 NOTE — CARE PLAN
Problem: Risk for Infection and Impaired Wound Healing  Goal: Patient will remain free from infection  Outcome: Progressing     Problem: Risk for Fluid Imbalance  Goal: Patient's fluid volume balance will be maintained or improve  Outcome: Progressing     The patient is Stable - Low risk of patient condition declining or worsening      Clinical Goals: adequate pain control with increased activity    Progress made toward(s) clinical / shift goals:  no signs of infection, generalized edema noted, patient encouraged to ambulate more frequently to mobilize fluids, adequate pain control with PRN medications

## 2021-07-15 NOTE — LACTATION NOTE
This note was copied from a baby's chart.  Follow-up visit, breast implants - nipples removed, able to hand express colostrum, suspected chrio- treated with antibiotics. Baby just had circumcision, sleepy- latch not seen at this time. Mother's nipples are bruised with compression strips, discussed positioning baby at breast for deeper latches, soothies & hand expression. LC hand expressed right breast then spoon fed back drops. Encouraged FOB to hand express left breast & spoon feed back, spoons at BS.    Breastfeeding plan:  Breastfeed on cue a minimum 8x/24 hours no longer than 4 hours from last feed. Hand express & spoon feed back in addition to breastfeeding until milk supply comes in.

## 2021-07-15 NOTE — PROGRESS NOTES
Obstetrics & Gynecology Post-Delivery Progress Note    Date of Service      40 y.o.  s/p vaginal, spontaneous  Delivery date: 21      Subjective  Pt reports that she has been feeling some pain. She has not been ambulating very much.  Pain: Yes,  controlled  Bleeding: lochia minimal  Tolerating PO: yes  Voiding: without difficulty  Ambulating: yes  Passing flatus: Yes  Feeding: breastfeeding well      Objective  24hr VS:  Temp:  [36.2 °C (97.2 °F)-37.2 °C (98.9 °F)] 37.2 °C (98.9 °F)  Pulse:  [84-94] 84  Resp:  [16-20] 18  BP: ()/(50-65) 102/56  SpO2:  [94 %-100 %] 100 %    Physical Exam  Gen: well and resting  CV: RRR   Resp: unlabored respirations, no intercostal retractions or accessory muscle use  Abd: soft, non-distended  Fundus: firm and below umbilicus  Incision: dressing clean, dry, intact  Ext: symmetric and no edema, calves nontender    Labs:  Recent Labs     21  0529 07/15/21  0416   WBC 21.7* 15.4*   RBC 3.72* 3.06*   HEMOGLOBIN 11.8* 9.7*   HEMATOCRIT 35.5* 28.8*   MCV 95.4 94.1   MCH 31.7 31.7   RDW 48.6 48.2   PLATELETCT 181 206   MPV 11.7 11.7   NEUTSPOLYS  --  73.10*   LYMPHOCYTES  --  18.40*   MONOCYTES  --  5.40   EOSINOPHILS  --  2.10   BASOPHILS  --  0.40         Medications  prenatal plus vitamin, 1 tablet, Oral, Daily-0800  gentamicin (GARAMYCIN) IV, 5 mg/kg (Order-Specific), Intravenous, Q24HR  MD Alert…Gentamicin per Pharmacy, , Other, PHARMACY TO DOSE      PRN medications: LR, oxytocin, misoprostol, methylergonovine, docusate sodium, simethicone, morphine injection, diphenhydrAMINE **OR** diphenhydrAMINE, acetaminophen, ondansetron **OR** ondansetron, oxyCODONE immediate release, oxyCODONE-acetaminophen, ibuprofen, acetaminophen      Assessment/Plan  Daria Fregoso is a 40 y.o.yo  postpartum day #2  s/p  for fetal distress    - Post care: meeting all goals  - D/c abx today.   - Pain: controlled  - Rh+, Rubella Immune  - Method of Feeding: plans to  breastfeed  - Method of Contraception: undecided  VTE prophylaxis: SCDs    - Disposition: likely home PPD3     Padilla Roe M.D.PGY1

## 2021-07-16 PROCEDURE — 700102 HCHG RX REV CODE 250 W/ 637 OVERRIDE(OP): Performed by: OBSTETRICS & GYNECOLOGY

## 2021-07-16 PROCEDURE — A9270 NON-COVERED ITEM OR SERVICE: HCPCS | Performed by: OBSTETRICS & GYNECOLOGY

## 2021-07-16 PROCEDURE — 700111 HCHG RX REV CODE 636 W/ 250 OVERRIDE (IP): Performed by: OBSTETRICS & GYNECOLOGY

## 2021-07-16 PROCEDURE — 770002 HCHG ROOM/CARE - OB PRIVATE (112)

## 2021-07-16 RX ORDER — IBUPROFEN 600 MG/1
600 TABLET ORAL EVERY 6 HOURS PRN
Qty: 30 TABLET | Refills: 0 | Status: SHIPPED | OUTPATIENT
Start: 2021-07-16 | End: 2021-07-27 | Stop reason: SDUPTHER

## 2021-07-16 RX ORDER — OXYCODONE HYDROCHLORIDE 10 MG/1
10 TABLET ORAL EVERY 4 HOURS PRN
Qty: 21 TABLET | Refills: 0 | Status: SHIPPED | OUTPATIENT
Start: 2021-07-16 | End: 2021-07-16

## 2021-07-16 RX ORDER — OXYCODONE HYDROCHLORIDE 10 MG/1
10 TABLET ORAL EVERY 4 HOURS PRN
Qty: 21 TABLET | Refills: 0 | Status: SHIPPED | OUTPATIENT
Start: 2021-07-16 | End: 2021-07-23

## 2021-07-16 RX ADMIN — DOCUSATE SODIUM 100 MG: 100 CAPSULE ORAL at 17:54

## 2021-07-16 RX ADMIN — PRENATAL WITH FERROUS FUM AND FOLIC ACID 1 TABLET: 3080; 920; 120; 400; 22; 1.84; 3; 20; 10; 1; 12; 200; 27; 25; 2 TABLET ORAL at 10:02

## 2021-07-16 RX ADMIN — SIMETHICONE 80 MG: 80 TABLET, CHEWABLE ORAL at 01:46

## 2021-07-16 RX ADMIN — IBUPROFEN 600 MG: 600 TABLET, FILM COATED ORAL at 23:24

## 2021-07-16 RX ADMIN — ACETAMINOPHEN 650 MG: 325 TABLET, FILM COATED ORAL at 01:46

## 2021-07-16 RX ADMIN — IBUPROFEN 600 MG: 600 TABLET, FILM COATED ORAL at 17:53

## 2021-07-16 RX ADMIN — ACETAMINOPHEN 650 MG: 325 TABLET, FILM COATED ORAL at 23:24

## 2021-07-16 RX ADMIN — IBUPROFEN 600 MG: 600 TABLET, FILM COATED ORAL at 10:02

## 2021-07-16 RX ADMIN — ONDANSETRON 4 MG: 4 TABLET, ORALLY DISINTEGRATING ORAL at 20:20

## 2021-07-16 RX ADMIN — ACETAMINOPHEN 650 MG: 325 TABLET, FILM COATED ORAL at 17:54

## 2021-07-16 RX ADMIN — ACETAMINOPHEN 650 MG: 325 TABLET, FILM COATED ORAL at 10:02

## 2021-07-16 RX ADMIN — IBUPROFEN 600 MG: 600 TABLET, FILM COATED ORAL at 01:47

## 2021-07-16 ASSESSMENT — PAIN DESCRIPTION - PAIN TYPE
TYPE: SURGICAL PAIN
TYPE: ACUTE PAIN;SURGICAL PAIN
TYPE: ACUTE PAIN

## 2021-07-16 NOTE — LACTATION NOTE
Met with MOB for a lactation follow up visit.  Three step feeding plan initiated last night by ALEJANDRINA RN, Madeleine Kelley, due to infant with fever which RN thought may be due to dehydration.  MOB with advanced maternal age and history of breast augmentation in 2019.  RN stated MOB received 5 ml of expressed breast milk from her first pumping session.    This LC assisted MOB with putting infant to the left breast in the cross cradle position.  Tummy to tummy and nipple to nose.  Demonstrated wedging of the breast to MOB for deeper latch.  Infant crying and attempted to latch briefly before pulling away from the breast.  Drops of donor breast milk placed to MOB's nipple and infant suckled briefly before pulling away from the breast.  Infant grew increasingly fussy.  Infant fed approximately 3-4 ml of donor breast milk via bottle.  Paced bottle feeding performed by this LC.  Poor suck-swallow pattern observed and infant appeared to struggle with breathing.  Bottle quickly removed from infant's mouth and change in color noted around infant's mouth.  This LC placed infant face down and began to pat and rub infant on the back.  During this time, change in tone occurred and infant became floppy very briefly.  Infant began to cry and tone restored and the color around infant's mouth became pink again. Infant also sounded congested in nose. This LC swaddled infant and allowed infant time to rest before attempting to re-introduce bottle.  One drop of saline solution (small tube of saline solution placed in infant's crib by NOC RN per MOB) placed in each nare.  Bottle was re-introduced and infant again provided with approximately 1-2 ml of donor breast milk.  He continued to sound congested at the nose and feeding was stopped after approximately 1-2 minutes to allow infant time to rest.  No color change or change in tone occurred in infant.  Infant again allowed time to rest.  Infant was then fed another 1-2 ml of donor  breast milk with time allowed afterwards for infant to rest as he still was congested.  At approximately 0917, Sejal Zamora RN entered the room and was apprised of the changes in infant which occurred during this feeding session.  RN retrieved monitor to check infant's O2 saturation and upon removing infant from receiving blanket, chest retractions observed.  Infant's O2 saturations checked and found to be between 92-94%.  Temperature was 98 F.  RN placed bottle in infant's mouth and while he sucking on nipple, his O2 saturations decreased to 83% with slight color change noted around the mouth area.  Feeding stopped and infant taken to NBN in open crib.    This LC assisted MOB with pumping.  Fit of 25 mm flanges appeared appropriate at each breast.  Pump settings reviewed and were: 80 CPM down to 60 after 2 minutes/suction set to comfort at 30%/pumps for 15 minutes.  MOB was encouraged to do 2-3 minutes of hand expression at each breast following each pumping session.  MOB denied pain at breasts with pump use.  She received drops of colostrum from each breast.    MOB was encouraged to pump every 3 hours while infant remains in the  nursery for observation.    MOB verbalized understanding of all information provided to her and denied having any further lactation questions at this time.  Encouraged MOB to call for lactation assistance as needed.

## 2021-07-16 NOTE — PROGRESS NOTES
Received report from GRACIE Howell. Discussed plan of care. Patient will call for pain medication as needed. All needs met at this time.

## 2021-07-16 NOTE — CARE PLAN
The patient is Stable - Low risk of patient condition declining or worsening    Shift Goals  Clinical Goals: Pain controlled to tolerable level set by pt.    Progress made toward(s) clinical / shift goals:  Pt receiving pain medication as needed.     Patient is not progressing towards the following goals:

## 2021-07-16 NOTE — PROGRESS NOTES
[id number] MD John notify MOB desire to stay one more day due to infant staying one more day. Discharge order cancelled. Patient denies pain at this time, will call if pain med needed.

## 2021-07-16 NOTE — DISCHARGE SUMMARY
Discharge Summary:     Date of Admission: 2021  Date of Discharge: 21      Admitting diagnosis:    1. Pregnancy @ 39w2d  2. IOL for A      Discharge Diagnosis:   1. Status post  for fetal distress, failure to progress.  2. Chorioamnionitis    History reviewed. No pertinent past medical history.  OB History    Para Term  AB Living   3 1 1   2 1   SAB TAB Ectopic Molar Multiple Live Births   1 1     0 1      # Outcome Date GA Lbr Tristan/2nd Weight Sex Delivery Anes PTL Lv   3 Term 21 39w2d  3.59 kg (7 lb 14.6 oz) M CS-LTranv EPI N SHIMON      Complications: Fetal Intolerance, Failure to Progress in First Stage, Primigravida of advanced maternal age in third trimester   2 TAB 2020             Past Surgical History:   Procedure Laterality Date   • PRIMARY C SECTION Bilateral 2021    Procedure:  SECTION, PRIMARY;  Surgeon: Pepe Torrez M.D.;  Location: [facility] LAB[facility] DELIVERY;  Service: Labor and Delivery   • APPENDECTOMY     • LIPOSUCTION     • MAMMOPLASTY AUGMENTATION     • NASAL RECONSTRUCTION       Patient has no known allergies.    Patient Active Problem List   Diagnosis   • Arthralgia of hand   • Arthralgia of right elbow   • Hyperlipidemia, unspecified   • Supervision of high risk pregnancy in third trimester   • Obesity in pregnancy   • AMA (advanced maternal age) multigravida 35+, second trimester       Hospital Course:   40 y.o. now , was admitted for IOL  to [facility], underwent Active Labor, Induction of Labor and Primary  fetal distress, failure to progress. Pt gave birth to baby boy with APGARs of 8/9 and weight 3590g.  Patient's postpartum course was unremarkable, with progressive advancement in diet , ambulation and toleration of oral analgesia. Patient without complaints today and desires discharge.    Physical Exam:  Temp:  [36.7 °C (98 °F)-37.3 °C (99.1 °F)] 36.9 °C (98.4 °F)  Pulse:  [82-96] 85  Resp:  [18-20]  18  BP: (104-124)/(62-68) 110/68  SpO2:  [95 %-99 %] 96 %  Physical Exam  General: well and resting  Abdomen: no masses, nontender, soft, non-distended  Fundus: firm, below umbilicus and nontender  Incision: dressing clean, dry, intact, healing well and no significant drainage  Perineum: deferred  Extremities: 1+ edema, calves nontender    Current Facility-Administered Medications   Medication Dose   • LR infusion     • PRN oxytocin (PITOCIN) (20 Units/1000 mL) PRN for excessive uterine bleeding - See Admin Instr  125-999 mL/hr   • miSOPROStol (CYTOTEC) tablet 600 mcg  600 mcg   • methylergonovine (METHERGINE) injection 0.2 mg  0.2 mg   • docusate sodium (COLACE) capsule 100 mg  100 mg   • simethicone (MYLICON) chewable tab 80 mg  80 mg   • prenatal plus vitamin (STUARTNATAL 1+1) 27-1 MG tablet 1 tablet  1 tablet   • morphine (pf) 10 mg/mL injection 4 mg  4 mg   • diphenhydrAMINE (BENADRYL) tablet/capsule 25 mg  25 mg    Or   • diphenhydrAMINE (BENADRYL) injection 25 mg  25 mg   • acetaminophen (Tylenol) tablet 325 mg  325 mg   • ondansetron (ZOFRAN) syringe/vial injection 4 mg  4 mg    Or   • ondansetron (ZOFRAN ODT) dispertab 4 mg  4 mg   • oxyCODONE immediate release (ROXICODONE) tablet 10 mg  10 mg   • oxyCODONE-acetaminophen (PERCOCET) 5-325 MG per tablet 1 tablet  1 tablet   • ibuprofen (MOTRIN) tablet 600 mg  600 mg   • acetaminophen (Tylenol) tablet 650 mg  650 mg       Recent Labs     07/14/21  0529 07/15/21  0416   WBC 21.7* 15.4*   RBC 3.72* 3.06*   HEMOGLOBIN 11.8* 9.7*   HEMATOCRIT 35.5* 28.8*   MCV 95.4 94.1   MCH 31.7 31.7   MCHC 33.2* 33.7   RDW 48.6 48.2   PLATELETCT 181 206   MPV 11.7 11.7         Activity/ Discharge Instructions::   Discharge to home  Pelvic Rest x 6 weeks  No heavy lifting x4 weeks  Call or come to ED for: heavy vaginal bleeding, fever >100.4, severe abdominal pain, severe headache, chest pain, shortness of breath,  N/V, incisional drainage, or other concerns.       Follow up: 1  week for incision check for  delivery.     Discharge Meds:   Current Outpatient Medications   Medication Sig Dispense Refill   • ibuprofen (MOTRIN) 600 MG Tab Take 1 tablet by mouth every 6 hours as needed (For cramping after delivery; do not give if patient is receiving ketorolac (Toradol)). 30 tablet 0   • oxyCODONE immediate release (ROXICODONE) 10 MG immediate release tablet Take 1 tablet by mouth every four hours as needed for up to 7 days. 21 tablet 0       Daria Fregoso is a 41 yo female  POD 3 s/p  for arrest of descent and fetal intolerance to labor and complicated by possible chorioamnionitis.    Daria completed 24 hours of abx yesterady AM and has been afebrile for >48 hours. Her postpartum course has otherwise been uncomplicated.    Plan:  Discharge home      Padilla Roe MD, PGY1

## 2021-07-16 NOTE — CARE PLAN
The patient is Stable - Low risk of patient condition declining or worsening    Shift Goals  Clinical Goals: pain management     Progress made toward(s) clinical / shift goals:  Pt able to ambulate, care for self. Pt states her pain is controlled with prn pain medication. Pt's pain reassessed throughout the shift.      Patient is not progressing towards the following goals:

## 2021-07-16 NOTE — PROGRESS NOTES
Assessment completed. WDL. Vital signs stable.Patient up and ambulating. Pt states she is voiding without difficulty. Patient claims to have good pain relief with prn pain medications. Pt states she would like her prn motrin and tylenol when it's due. Educated pt about putting infant to breast every 2-3 hours or when infant is showing cues, supplementing and pumping.Pt encouraged to call for next feed to assess latch. Pt encouraged to call for any needs.

## 2021-07-17 VITALS
DIASTOLIC BLOOD PRESSURE: 78 MMHG | WEIGHT: 223 LBS | BODY MASS INDEX: 33.8 KG/M2 | TEMPERATURE: 97.6 F | OXYGEN SATURATION: 97 % | HEART RATE: 77 BPM | SYSTOLIC BLOOD PRESSURE: 107 MMHG | RESPIRATION RATE: 18 BRPM | HEIGHT: 68 IN

## 2021-07-17 LAB
ERYTHROCYTE [DISTWIDTH] IN BLOOD BY AUTOMATED COUNT: 47 FL (ref 35.9–50)
HCT VFR BLD AUTO: 28.2 % (ref 37–47)
HGB BLD-MCNC: 9.3 G/DL (ref 12–16)
MCH RBC QN AUTO: 31.2 PG (ref 27–33)
MCHC RBC AUTO-ENTMCNC: 33 G/DL (ref 33.6–35)
MCV RBC AUTO: 94.6 FL (ref 81.4–97.8)
PLATELET # BLD AUTO: 286 K/UL (ref 164–446)
PMV BLD AUTO: 11 FL (ref 9–12.9)
RBC # BLD AUTO: 2.98 M/UL (ref 4.2–5.4)
WBC # BLD AUTO: 9.1 K/UL (ref 4.8–10.8)

## 2021-07-17 PROCEDURE — 700102 HCHG RX REV CODE 250 W/ 637 OVERRIDE(OP): Performed by: OBSTETRICS & GYNECOLOGY

## 2021-07-17 PROCEDURE — 36415 COLL VENOUS BLD VENIPUNCTURE: CPT

## 2021-07-17 PROCEDURE — 85027 COMPLETE CBC AUTOMATED: CPT

## 2021-07-17 PROCEDURE — A9270 NON-COVERED ITEM OR SERVICE: HCPCS | Performed by: OBSTETRICS & GYNECOLOGY

## 2021-07-17 RX ORDER — PSEUDOEPHEDRINE HCL 30 MG
100 TABLET ORAL 2 TIMES DAILY PRN
Qty: 60 CAPSULE | Refills: 0 | Status: SHIPPED | OUTPATIENT
Start: 2021-07-17 | End: 2021-07-31

## 2021-07-17 RX ORDER — SIMETHICONE 80 MG
80 TABLET,CHEWABLE ORAL 4 TIMES DAILY PRN
Qty: 30 TABLET | Refills: 3 | Status: SHIPPED | OUTPATIENT
Start: 2021-07-17

## 2021-07-17 RX ADMIN — IBUPROFEN 600 MG: 600 TABLET, FILM COATED ORAL at 05:31

## 2021-07-17 RX ADMIN — ACETAMINOPHEN 650 MG: 325 TABLET, FILM COATED ORAL at 05:32

## 2021-07-17 RX ADMIN — PRENATAL WITH FERROUS FUM AND FOLIC ACID 1 TABLET: 3080; 920; 120; 400; 22; 1.84; 3; 20; 10; 1; 12; 200; 27; 25; 2 TABLET ORAL at 08:26

## 2021-07-17 RX ADMIN — IBUPROFEN 600 MG: 600 TABLET, FILM COATED ORAL at 11:25

## 2021-07-17 RX ADMIN — DOCUSATE SODIUM 100 MG: 100 CAPSULE ORAL at 05:32

## 2021-07-17 RX ADMIN — ACETAMINOPHEN 650 MG: 325 TABLET, FILM COATED ORAL at 11:25

## 2021-07-17 ASSESSMENT — EDINBURGH POSTNATAL DEPRESSION SCALE (EPDS)
I HAVE BLAMED MYSELF UNNECESSARILY WHEN THINGS WENT WRONG: NO, NEVER
I HAVE FELT SAD OR MISERABLE: YES, QUITE OFTEN
I HAVE BEEN ABLE TO LAUGH AND SEE THE FUNNY SIDE OF THINGS: NOT QUITE SO MUCH NOW
I HAVE BEEN SO UNHAPPY THAT I HAVE BEEN CRYING: NO, NEVER
I HAVE LOOKED FORWARD WITH ENJOYMENT TO THINGS: RATHER LESS THAN I USED TO
THINGS HAVE BEEN GETTING ON TOP OF ME: NO, MOST OF THE TIME I HAVE COPED QUITE WELL
I HAVE FELT SCARED OR PANICKY FOR NO GOOD REASON: YES, SOMETIMES
THE THOUGHT OF HARMING MYSELF HAS OCCURRED TO ME: NEVER
I HAVE BEEN ANXIOUS OR WORRIED FOR NO GOOD REASON: YES, SOMETIMES
I HAVE BEEN SO UNHAPPY THAT I HAVE HAD DIFFICULTY SLEEPING: NOT VERY OFTEN

## 2021-07-17 ASSESSMENT — PAIN DESCRIPTION - PAIN TYPE
TYPE: SURGICAL PAIN
TYPE: ACUTE PAIN
TYPE: ACUTE PAIN
TYPE: SURGICAL PAIN
TYPE: ACUTE PAIN
TYPE: SURGICAL PAIN

## 2021-07-17 NOTE — LACTATION NOTE
Met with MOB for a lactation follow up visit.  MOB stated she has not attempted to put infant to the breast since he was taken to the  nursery yesterday for observation.  She stated she continues to supplement infant with donor breast milk/expressed breast and pump.  However, she stated she had not pumped since 0615 this morning.  Latch assistance was offered, but MOB declined.  She stated she will seek latch assistance from [facility] post discharge, but will continue to supplement infant with formula and/or her expressed breast milk and pump as instructed.  She appeared slightly overwhelmed with all the information being given to her as she is preparing to discharge home and asked for information to be written down for her by pediatrician and this .  MOB stated she is receiving approximately 5 ml of expressed breast milk from her right breast and one drop of expressed breast milk from her left breast.    Discussed the importance of pumping consistently to protect and grow milk supply with MOB.  MOB was informed that she needs to pump a minimum of 8 or more times in a 24 hour period.  She was also told to perform hand massage at each breast prior to pumping and to hand express for approximately 2-3 minutes at each breast following each pumping session.  Flange fit assessed.  The flange at the left breast appeared just slightly small at the base of the nipple.  The right flange fit appropriately at the right breast.  MOB provided with 28.5 mm flange for the left breast and the fit appeared less snug at the base of nipple.  Pump settings also reviewed with MOB and were found to be: 80 CPM down to 60 after 2 minutes/suction set to comfort at 33%/pumps for 15 minutes.  MOB was informed she may increase/decrease suction rate per comfort level.    MOB stated she has a personal breast pump for home use, but expressed interest in renting a hospital grade breast pump from the [employer] at  discharge.  MOB provided with business hours for the [facility].  She was also encouraged to take all pump parts home with her.    MOB provided with written contact information for the [facility].  She was encouraged to promptly follow up with them post discharge for additional latch assistance and evaluation of milk supply.  MOB also provided with written information on the breastfeeding resources available to her through [facility] including the [facility] [place] (via Zoom).    Breastfeeding Plan:  1.  May attempt to put infant to the breast first at every feed.  2.  Supplement all feeds with formula and/or expressed breast milk per the hospital supplementation guidelines.  3.  Pump as instructed.  Perform hand massage at each breast prior to each pumping session, pump for 15 minutes (settings: 80 CPM down to 60 after 2 minutes/suction set to comfort at 33%/pump for 15 minutes) and then perform 2-3 minutes of hand expression at each breast following each pumping session.    MOB provided with a copy of the [facility] milk storage guidelines.    MOB verbalized understanding of all information provided to her and denied having any further lactation uestions at this time.  Encouraged MOB to call for lactation assistance as needed prior to discharge.

## 2021-07-17 NOTE — DISCHARGE INSTRUCTIONS
PATIENT DISCHARGE EDUCATION INSTRUCTION SHEET  REASONS TO CALL YOUR OBSTETRICIAN  · Persistent fever, shaking, chills (Temperature higher than 100.4) may indicate you have an infection  · Heavy bleeding: soaking more than 1 pad per hour; Passing clots an egg-sized clot or bigger may mean you have an postpartum hemorrhage  · Foul odor from vagina or bad smelling or discolored discharge or blood  · Breast infection (Mastitis symptoms); breast pain, chills, fever, redness or red streaks, may feel flu like symptoms  · Urinary pain, burning or frequency  · Incision that is not healing, increased redness, swelling, tenderness or pain, or any pus from episiotomy or  site may mean you have an infection  · Redness, swelling, warmth, or painful to touch in the calf area of your leg may mean you have a blood clot  · Severe or intensified depression, thoughts or feelings of wanting to hurt yourself or someone else   · Pain in chest, obstructed breathing or shortness of breath (trouble catching your breath) may mean you are having a postpartum complication. Call your provider immediately   · Headache that does not get better, even after taking medicine, a bad headache with vision changes or pain in the upper right area of your belly may mean you have high blood pressure or post birth preeclampsia. Call your provider immediately    HAND WASHING  All family and friends should wash their hands:  · Before and after holding the baby  · Before feeding the baby  · After using the restroom or changing the baby's diaper    WOUND CARE  Ask your physician for additional care instructions. In general:  ·  Incision:  · May shower and pat incision dry   · Keep the incision clean and dry  · There should not be any opening or pus from the incision  · Continue to walk at home 3 times a day   · Do NOT lift anything heavier than your baby (over 10 pounds)  · Encourage family to help participate in care of the  to allow  rest and mom time to heal  · Episiotomy/Laceration  · May use junie-spray bottle, witch hazel pads and dermaplast spray for comfort  · Use junie-spray bottle after urinating to cleanse perineal area  · To prevent burning during urination spray junie-water bottle on labial area   · Pat perineal area dry until episiotomy/laceration is healed  · Continue to use junie-bottle until bleeding stops as needed  · If have a 2nd degree laceration or greater, a Sitz bath can offer relief from soreness, burning, and inflammation   · Sitz Bath   · Sit in 6 inches of warm water and soak laceration as needed until the laceration heals    VAGINAL CARE AND BLEEDING  · Nothing inside vagina for 6 weeks:   · No sexual intercourse, tampons or douching  · Bleeding may continue for 2-4 weeks. Amount and color may vary  · Soaking 1 pad or more in an hour for several hours is considered heavy bleeding  · Passing large egg sized blood clots can be concerning  · If you feel like you have heavy bleeding or are having increasing amount of blood clots call your Obstetrician immediately  · If you begin feeling faint upon standing, feeling sick to your stomach, have clammy skin, a really fast heartbeat, have chills, start feeling confused, dizzy, sleepy or weak, or feeling like you're going to faint call your Obstetrician immediately    HYPERTENSION   Preeclampsia or gestational hypertension are types of high blood pressure that only pregnant women can get. It is important for you to be aware of symptoms to seek early intervention and treatment. If you have any of these symptoms immediately call your Obstetrician    · Vision changes or blurred vision   · Severe headache or pain that is unrelieved with medication and will not go away  · Persistent pain in upper abdomen or shoulder   · Increased swelling of face, feet, or hands  · Difficulty breathing or shortness of breath at rest  · Urinating less than usual    URINATION AND BOWEL MOVEMENTS  · Eating  "more fiber (bran cereal, fruits, and vegetables) and drinking plenty of fluids will help to avoid constipation  · Urinary frequency and urgency after childbirth is normal  · If you experience any urinary pain, burning or frequency call your provider    BIRTH CONTROL  · It is possible to become pregnant at any time after delivery and while breastfeeding  · Plan to discuss a method of birth control with your physician at your post delivery follow up visit    POSTPARTUM BLUES  During the first few days after birth, you may experience a sense of the \"blues\" which may include impatience, irritability or even crying. These feelings come and go quickly. However, as many as 1 in 10 women experience emotional symptoms known as postpartum depression.     POSTPARTUM DEPRESSION    May start as early as the second or third day after delivery or take several weeks or months to develop. Symptoms of \"blues\" are present, but are more intense: Crying spells; loss of appetite; feelings of hopelessness or loss of control; fear of touching the baby; over concern or no concern at all about the baby; little or no concern about your own appearance/caring for yourself; and/or inability to sleep or excessive sleeping. Contact your Obstetrician if you are experiencing any of these symptoms     PREVENTING SHAKEN BABY  If you are angry or stressed, PUT THE BABY IN THE CRIB, step away, take some deep breaths, and wait until you are calm to care for the baby. DO NOT SHAKE THE BABY. You are not alone, call a supporter for help.  · [facility] 24/7 crisis call line (951-335-6103) or (1-491.640.7662)  · You can also text them, text \"ANSWER\" (470650)      "

## 2021-07-17 NOTE — PROGRESS NOTES
Assumed care. Assessment completed, VSS. Fundus firm, lochia scant. Pt. ambulating and voiding. Pt. requests pain medication as scheduled.POC discussed, all questions answered. Encouraged to call with needs.

## 2021-07-17 NOTE — PROGRESS NOTES
"Pt c/o \"chills\" this morning, afebrile, VSS. Dr. Ramos called and made aware of pt complaint and history, CBC ordered. Senthil Sanches R.N.  "

## 2021-07-17 NOTE — CARE PLAN
Problem: Infection - Postpartum  Goal: Postpartum patient will be free of signs and symptoms of infection  Outcome: Progressing     Problem: Bowel Elimination - Post Surgical  Goal: Patient will resume regular bowel sounds and function with no discomfort or distention  Outcome: Progressing     Problem: Early Mobilization - Post Surgery  Goal: Early mobilization post surgery  Outcome: Progressing     The patient is Stable - Low risk of patient condition declining or worsening    Shift Goals  Clinical Goals: pain control, lochia wdl    Progress made toward(s) clinical / shift goals:  No s/s infection noted, temperature remains stable, pt ambulating without difficulty, passing gas, tolerating a regular diet.

## 2021-07-17 NOTE — CARE PLAN
The patient is Stable - Low risk of patient condition declining or worsening    Shift Goals  Clinical Goals: verbalize acceptable pain level    Progress made toward(s) clinical / shift goals:  Pain controlled with PRN pain meds    Problem: Infection - Postpartum  Goal: Postpartum patient will be free of signs and symptoms of infection  Note: VSS. No signs or symptoms of infection noted     Problem: Respiratory/Oxygenation Function Post-Surgical  Goal: Patient will achieve/maintain normal respiratory rate/effort  Outcome: Progressing  Note: No signs or symptoms of respiratory distress

## 2021-07-23 ENCOUNTER — OFFICE VISIT (OUTPATIENT)
Dept: OBGYN | Facility: CLINIC | Age: 41
End: 2021-07-23
Payer: COMMERCIAL

## 2021-07-23 DIAGNOSIS — O92.70 LACTATION PROBLEM: ICD-10-CM

## 2021-07-23 DIAGNOSIS — O92.5 LACTATION SUPPRESSION: ICD-10-CM

## 2021-07-23 PROCEDURE — 99215 OFFICE O/P EST HI 40 MIN: CPT | Performed by: NURSE PRACTITIONER

## 2021-07-23 PROCEDURE — G2212 PROLONG OUTPT/OFFICE VIS: HCPCS | Performed by: NURSE PRACTITIONER

## 2021-07-23 NOTE — PROGRESS NOTES
Summary: Difficulty in hospital with breastfeeding left pumping with personal pump, pump place closed to  hospital pump. Pumps about 3 times per day, breastfeeds and the longest he stayed on was about 6 minutes, but tries every time. Supplements with formula. Good growth today.Today offered bare breast, firm, poor pliability but removed 6ml, most frustrated and searching, used a nipple shield for an additional 15ml on the left breast and 1.2oz on the right. Not an easy latch, but once he had it, he did well. Pumped 70ml of transitional milk, had not pumped since last evening.Plan Dad back to work, mom has no family here, boarders closed with COVID. Committed to breastfeeding. Nighttime 10p-6am, bottle and pump. 6am - 8pm offer breast with or without nipple shield.  If frustrated move to bottle. Pump after a total of 8 feedings per day to properly stimulate and empty the breast, both ways of increasing supply.Follow up Next  for lactation     Subjective:     Daria Fregoso is a 40 y.o. female here for lactation care. She is here today with baby.      Concerns:   Latch on difficulties , feeling that there is not enough milk  and baby not interested  HPI:   Pertinent  history: c/section Fetal Intolerance, Failure to Progress in First Stage,   Mother does not have a history of GDM, hypertension prior to pregnancy, insulin resistance, multiple gestation, PCOS and thyroid disease. Common condition(s) which may interfere with milk supply.    Mother does have advanced maternal age. Common condition(s) that may interfere in milk supply.    Breast changes in pregnancy: Yes  History of breast surgeries: Yes, augmentation, nipples removed     FEEDING HISTORY:    Past breastfeeding history:  First baby   Hospital course: Mother's nipples are bruised with compression strips, encouraged FOB to hand express left breast & spoon feed back, spoons. Three step feeding plan initiated. Poor suck-swallow  pattern observed and infant appeared to struggle with breathing.  Bottle quickly removed from infant's mouth and change in color noted around infant's mouth, while  sucking on nipple, his O2 saturations decreased to 83% with slight color change noted around the mouth area. MOB provided with 28.5 mm flange for the left breast and the fit appeared less snug at the base of nipple  Currently 7/23/2021: Pumps about 3 times per day, breastfeeds and the longest he stayed on was about 6 minutes, but tries every time. Supplements with formula.    Both breasts: Yes  Bottle feeds: 8x/24h    Supplement: Expressed breast milk, Formula and Donor milk  Quantity: 60ml  How given/devices:  Bottle    Nipple Shield Use: None    Breast Pumping:   Frequency: 3x/day  Type of pump: Personal babybella?  Flange size/type: 24mm  NO pain with pumping    Maternal ROS:  Constitutional: No fever, chills. Feeling well  Breasts: No soreness of breasts and No soreness of nipples  Psychiatric: Managing ok, no family except  who is working full time  Mental Health: No mention of feeling irritable, agitated, angry, overwhelmed, apathy, exhaustion nor having sleep changes outside infant feeds/demands or appetite changes       Objective:     Maternal Physical   General: No acute distress  Breasts: Symetrical , Plugged Duct - no evidence and Mastitis  - no S/S. Firm breasts, little elasticity.  Nipples: intact, soft, some eversion with stimulation  Psychiatric:  Normal mood and affect. Her behavior is normal. Judgment and thought content normal   Mental Health:  Did NOT exhibit sadness, crying, feeling overwhelmed, agitation or hypervigilance.     Assessment/Plan & Lactation Counseling:     Infant exam on infant chart    Infant Weight History:   7/13/21 7#14.6oz  7/19/21 7#10.8oz  7/23/2021 8#2.0oz    Infant intake at Breast:: Left 6ml bare breast, right 15ml NS     Right 1.2oz    Total: 57ml  Milk Transfer at this feeding:   Effective  breastfeeding   Pumped: Type of Pump: Platinum    Quantity Pumped: Total: 70ml transitional milk on day 10  Initiation of Feeding: Infant initiates  Position of Feeding:    Right: football  Left: cross cradle  Attachment Achieved: with difficulty  Nipple shield:     Size: 24mm       Introduced today  Latch achieved yes  Suck Pattern at the breast: Suck burst and normal rest  Suck Pattern on the bottle: Suck burst and normal rest, frequent pauses  Behavior Following Observed Feeding: content  Nipple Pain: None       Latch: Assisted latch, Latch difficulty without nipple shield and Shallow latch  Suckling/Feeding: attaches, baby fed effectively, baby roots, elicits CIARAN and rhythmic  Milk Supply Available: low  Low milk supply:   Likely due to: ineffective or infrequent breast stimulation or milk removal    Maternal Diagnosis/Problem:  Lactation suppression   Lactation problem, less pliable breast tissue    BREASTFEEDING PLAN  Discussed concerns and symptoms as listed above in assessment and guidance summarized below.  Topics reviewed included:  •  Herbs and medications for increasing supply and their potential side effects and efficacy. No evidence base exists to support their use  •  Triple feeding and its sustainability and its impact on the mother baby relationship  •  Maternal Mental Health: Having a new baby can be joyful time for some new moms, but a difficult time for others. For all, it is a time of profound physical and emotional change. Balancing baby, family, partners and friends, work, pets, and preexisting or new life stressors as well as sleep deprivation can be extremely challenging.Up to 20% of moms experience postpartum depression, and postpartum anxiety may also present in moms who otherwise report no depression symptoms.  Parents need support, not necessarily education. Our society valorizes breastfeeding so we provide the scaffolding that actually supports it.   •  Self Care. Encouraged  support,  rest, getting out of the house each day, walk to the mailbox or drive somewhere,  a treat at a drive thru.  • Milk supply is dependent on glandular tissue development, hormonal influences, how many times the baby removes milk and how well the breasts are emptied in a 24 hour period. This is a biological reality that we can NOT work around. If, for any reason, your baby is not latching, or you are not able to nurse, then it is important for you to remove the milk instead by pumping or hand expression.  There's no magic trick, tea, food, drink, cookie or supplement that will increase your milk supply. One  must  effectively remove milk to continue to make and maximize milk. In the early days and weeks that can be 8+ times in 24 hours. For older babies, on average 6-7 + times in 24 hours.      • Low Milk Supply: Causes  o Lack of nipple/breast stimulation (Baby at the breast but not suckling, mostly non nutritive sucking)  o Lack of breast emptying (Baby at the breast but no removing much milk)  •  Feeding:   o Feed your baby every 1.5-3 hours, more often if baby acts hungry.   o Awaken baby for feeding if going over 3 hours in the day.    o Need to get in 8-12 feedings per 24 hours.   o  Given infants weight you may allow baby to go longer at night but that generally means shorter durations in the day.    •  Supplement:   • Supplement with expressed milk  • Supplement with formula      •  When bottle-feeding, there are three primary things to consider:    o Nipple Shape:  - Look for a nipple that looks like a “breast at work” not a “breast at rest.”  A “breast at work” has a somewhat cone shape as the nipple and breast tissue is pulled into the baby’s mouth while feeding.  Your baby’s mouth should be able to go around the widest part of the nipple to form a wide-open gape on the bottle like that of a good latch at the breast. In contrast, a breast at rest might look more like, well, a breast: a roundish base  with a long skinny nipple.  If the bottle looks like this, your baby’s lips may not be able to get around the widest part of the nipple because it is just too wide resulting in a narrow gape that would hurt on your nipple. This nipple shape may also make it difficult for your baby to make a complete seal with their lips which leads to air intake and milk spillage.  o Flow Rate of the Nipple:  - The nipple flow should be slow.  Don’t just read the label, but notice how your baby is feeding and trust what you observe.  A study done a few years ago found that “slow flow” varied widely between brands and even between nipples of the same brand.  Try several nipples until you find one that results in a rhythmic sucking pattern but not chugging and gulping.  o Pacing the feeding:  - A slow flow nipple helps, but how you feed the baby is more important.  Good positioning can compensate for a faster flow nipple.  When bottle-feeding, the baby should control how much is consumed at a feeding.  Holding the baby in an upright position with the bottle horizontal ensures that the baby gets milk only when sucking.  Here is a nice video demonstrating this concept of paced bottle feeding,  https://www.youLookStatube.com/watch?v=FdYMD2mEY9G    •  Pacifier Use:  The [employer] Position Paper reports: Although we recommend a conservative approach regarding pacifier use, we do not endorse a complete ban on the use of pacifiers, nor do we support an approach that induces parental guilt concerning their choices about the use of pacifiers.    •  Nipple shield: We prefer the 24mm Medela. Before applying, roll shield in on itself and allow breast to be pulled  in to the shield tip.     • Positioning Techniques for bare breast  o Suggested positions Cross cradle and Football  o Fine tune position by making sure your fingers beneath the breast as well as your bra, are out of the way of your baby's chin.  o Positioning:  Many  positions shown, great sidelying at 7 minutes.   o See http://globalhealthmedia.org/portfolio-items/positions-for-breastfeeding/?iepnoxvefFN=85632    •  Latch on Techniques for bare breast. Modify for nipple shield use  o Fine tune latch:  - By holding your baby more securely at the breast, assisting your baby to stay attached by:  - Bringing your baby to your breast, not breast to the baby  - Your baby's cheek to touch breast securely, nose tipped back  - Hold your baby firmly in place so when your baby forgets to suck and picks it back up again your baby is in the correct spot. You will be extinguishing behavior and replacing it with a deeper latch to stimulate suck and provide satisfaction at the breast  - Your baby needs as much breast as deep in the mouth as possible to allow your nipples to heal and for you more importantly to maximize efficiency at the breast  - Latch is asymmetrical, leading with the chin, getting more underneath.    •  Triple feeding: A short term solution: Breast/bottle/pump:  o FIRST decide what you can do at that feeding and you may decide to double feed -pump and bottle, if you are going to offer the breast at that feeding:  - nurse first and limit time on the breasts to 20+/- min total  - finish with bottle  - pump afterwards to finish emptying your breasts which will help increase milk supply  - use your own pumped milk, formula or donor human milk  - 30gm or ml = 1oz    •  Pumping Guidelines:  - Both breasts.  o Sustaining a healthy baseline prolactin level is vital- this means feeding/pumping at least every 3 hours with no more than a 5 hour break at night.   o Pump 8 times in 24 hours  o Type of pump:  - [place]  - http://www.[employer]/healthcare-professionals/videos/ameda-platinum-with-hygienikit-video  - Settings  Starts at 80, decrease speed to 60 when milk appears  - Suction to comfort, you were at 51% today  - Always double pump  o How long will vary woman to woman, typically  8-15 minutes, or 1-2 minutes after flow slows  o Flange Fit: Freely moving nipple in the tunnel with some movement of the areola.  - Today's evaluation indicates appropriate flange, may be a little big, but will let pain be our guide for now    • Storage (Acceptable guidelines for healthy term babies)  o 10 hours at room temp including your pieces, may rinse but not mandatory  o 8 days refrigerator, don't need  to refrigerate right away if using fresh pumped milk at the next feeding  o Freezer 1 year  o Deep freezer 2 years  o If baby drinks breastmilk from a fresh or refrigerated bottle of breastmilk,  you may return the unused portion to the refrigerator and use once at next feeding.      Increasing supply besides Galactogogues and Pumping:   o Warmth  o Relaxation   o Physical, auditory narratives  o Childbirth relaxation Techniques  o Shoulder Massages  o Take a nap    •  Connect with other mothers:  o Facebook:   - [place] Breastfeeds: https://www.[employer]/[place].breastfeeds/  - Well-Nourished Babies (Private group for questions and support): https://www.facebook.com/groups/824833972056601/  o Breastfeeding [place]   - This is an emotional, informational and safe support [facility] with your like-minded community that builds solidarity among moms  - The honest experiences of mothers are highly valued among the other mothers  - Tuesday  at 11am by [place],  you will be sent an invitation.   - You may instead copy and paste this link:    [email or web address]    - Passcode  [id number]  - You may share this link with friends; please don't post on social media.    In Conclusion:   Family present has verbalized what they can realistically do based on family dynamics, understanding a plan has to be doable to be effective and can be renegotiated at any time.  This is a complex and intimate journey. When obstacles present themselves, it takes confidence,  persistence and support. You are now the focus of our Breastfeeding Medicine team; we are here to support your decisions and goals.      Follow up requires close monitoring in this time sensitive window of opportunity to establish milk supply and facilitate the learning of  breastfeeding.    Mom is encouraged to e-mail to update how the plan is working.    Follow-up for infant weight check and dyad breastfeeding evaluation in 7 day(s)  Please call [phone] if you have not scheduled your next appointment    A total of 72 minutes, not including infant assessment time, with more than 50% was spent preparing to see the patient, obtaining and reviewing separately obtained history, performing a medically appropriate examination and evaluation, counseling and educating the family, documenting clinical information in the electronic health record, independently interpreting weighted feeds and infant growth results, communicating these results to the family and care coordination as detailed in the above note.       JAREK Bertrand.

## 2021-07-27 ENCOUNTER — POST PARTUM (OUTPATIENT)
Dept: OBGYN | Facility: CLINIC | Age: 41
End: 2021-07-27
Payer: COMMERCIAL

## 2021-07-27 VITALS — SYSTOLIC BLOOD PRESSURE: 131 MMHG | DIASTOLIC BLOOD PRESSURE: 62 MMHG | BODY MASS INDEX: 30.11 KG/M2 | WEIGHT: 198 LBS

## 2021-07-27 DIAGNOSIS — Z09 POSTOP CHECK: ICD-10-CM

## 2021-07-27 DIAGNOSIS — Z98.891 S/P CESAREAN SECTION: ICD-10-CM

## 2021-07-27 PROCEDURE — 99024 POSTOP FOLLOW-UP VISIT: CPT | Performed by: OBSTETRICS & GYNECOLOGY

## 2021-07-27 RX ORDER — IBUPROFEN 600 MG/1
600 TABLET ORAL EVERY 6 HOURS PRN
Qty: 30 TABLET | Refills: 0 | Status: SHIPPED | OUTPATIENT
Start: 2021-07-27

## 2021-07-27 ASSESSMENT — FIBROSIS 4 INDEX: FIB4 SCORE: 0.64

## 2021-07-27 NOTE — PROGRESS NOTES
Pt. here for C/S check delivered on 7/16/21  Currently : breastfeeding  Pt. States no compliants  Chaperone offered provided

## 2021-07-27 NOTE — PROGRESS NOTES
Subjective:      Daria Fregoso is a 40 y.o. female who presents for postop check            HPI patient is a 40-year-old P 1 who presents today for postop check status post primary  on 2021.  Patient is doing well currently without any complaints.  Pain is controlled and she states she never filled her narcotic prescription.  She is taking ibuprofen as needed.  Denies fevers or dysuria.  States most of the bleeding has resolved.  She reports normal bowel and bladder functions.  Denies any depression symptoms.  Patient is bottlefeeding baby.    ROS all organ systems are reviewed and are currently negative       Objective:     /62   Wt 89.8 kg (198 lb)   BMI 30.11 kg/m²      Physical Exam  Vitals and nursing note reviewed. Exam conducted with a chaperone present.   Constitutional:       General: She is not in acute distress.     Appearance: Normal appearance. She is not toxic-appearing.   Eyes:      General: No scleral icterus.        Right eye: No discharge.         Left eye: No discharge.      Conjunctiva/sclera: Conjunctivae normal.   Cardiovascular:      Rate and Rhythm: Normal rate and regular rhythm.      Pulses: Normal pulses.      Heart sounds: Normal heart sounds. No murmur heard.   No gallop.    Pulmonary:      Effort: Pulmonary effort is normal. No respiratory distress.      Breath sounds: Normal breath sounds. No wheezing.   Abdominal:      General: Abdomen is flat. Bowel sounds are normal. There is no distension.      Palpations: Abdomen is soft.      Tenderness: There is no abdominal tenderness.      Comments: Incision is intact and healing well   Musculoskeletal:         General: Normal range of motion.      Cervical back: Normal range of motion and neck supple.      Right lower leg: No edema.      Left lower leg: No edema.   Skin:     General: Skin is warm and dry.      Coloration: Skin is not jaundiced.   Neurological:      General: No focal deficit present.      Mental Status:  She is alert and oriented to person, place, and time.   Psychiatric:         Mood and Affect: Mood normal.         Behavior: Behavior normal.         Thought Content: Thought content normal.         Judgment: Judgment normal.                        Assessment/Plan:        1. Postop check  Patient is 2-week postop status post primary .  Incision is healing well and patient is doing well overall.  Patient is instructed to continue postoperative restrictions for 4 weeks  We discussed contraception options and patient is unsure of what method she will use.  She was using OCP prior to getting pregnant accidentally.  We discussed long-acting reversible forms of contraception is also available and brochures were provided.  Patient will let us know what option she wants to use    2. S/P  section    3.  Precautions and plan of care were reviewed.  Patient to follow-up in about 4 weeks for postpartum exam.

## 2021-07-30 ENCOUNTER — OFFICE VISIT (OUTPATIENT)
Dept: OBGYN | Facility: CLINIC | Age: 41
End: 2021-07-30
Payer: COMMERCIAL

## 2021-07-30 DIAGNOSIS — O92.5 LACTATION SUPPRESSION: ICD-10-CM

## 2021-07-30 PROCEDURE — 99417 PROLNG OP E/M EACH 15 MIN: CPT | Performed by: NURSE PRACTITIONER

## 2021-07-30 PROCEDURE — 99215 OFFICE O/P EST HI 40 MIN: CPT | Performed by: NURSE PRACTITIONER

## 2021-07-30 NOTE — PROGRESS NOTES
Summary:Has been pumping about 4x/day and breastfeeding same. Triple feeding is not easy. Does use the nipple shield for feedings but feels he only takes about 20ml from the breast. Able to pump 20-40ml. Desires to do more breastfeeding. Using about 15-18oz of formula per day.Today offered bare breast, firm, poor pliability  used a nipple shield for an additional 1.8oz on the right breast and 0.9oz on the left, followed up with an ounce of pumped milk. Mom had not stimulated the breasts for 12 hours. Baby effective at breastfeeding. Plan Despite limitations mentioned last visit mom will offer breast several times a day but always top off. Pump as she can but baby can remove the milk, either is stimulation depending on what mom can do at the time. Full supply is highly unlikely but increased supply is likely if increased stimulation Follow up lactation as needed    Subjective:     Daria Fregoso is a 40 y.o. female here for lactation care. She is here today with baby.    Concerns:   Latch on difficulties , feeling that there is not enough milk  and baby not interested  HPI:   Pertinent  history: c/section Fetal Intolerance, Failure to Progress in First Stage,   Mother does not have a history of GDM, hypertension prior to pregnancy, insulin resistance, multiple gestation, PCOS and thyroid disease. Common condition(s) which may interfere with milk supply.    Mother does have advanced maternal age. Common condition(s) that may interfere in milk supply.    Breast changes in pregnancy: Yes  History of breast surgeries: Yes, augmentation, nipples removed     FEEDING HISTORY:    Past breastfeeding history:  First baby   Hospital course: Mother's nipples are bruised with compression strips, encouraged FOB to hand express left breast & spoon feed back, spoons. Three step feeding plan initiated. Poor suck-swallow pattern observed and infant appeared to struggle with breathing.  Bottle quickly removed from  infant's mouth and change in color noted around infant's mouth, while  sucking on nipple, his O2 saturations decreased to 83% with slight color change noted around the mouth area. MOB provided with 28.5 mm flange for the left breast and the fit appeared less snug at the base of nipple  Prior to consultation on 7/23/2021: Pumps about 3 times per day, breastfeeds and the longest he stayed on was about 6 minutes, but tries every time. Supplements with formula.  Currently 7/30/2021 Has been pumping about 4x/day and breastfeeding same. Triple feeding is not easy. Does use the nipple shield for feedings but feels he only takes about 20ml from the breast. Able to pump 20-40ml. Desires to do more breastfeeding. Using about 15-18oz of formula per day.    Both breasts: Yes  Bottle feeds: 8x/24h    Supplement: Expressed breast milk, Formula and Donor milk  Quantity: 90ml  How given/devices:  Bottle    Nipple Shield Use: None    Breast Pumping:   Frequency: 3-4x/day  Type of pump: Platinum  Flange size/type: 24mm  NO pain with pumping    Maternal ROS:  Constitutional: No fever, chills. Feeling well  Breasts: No soreness of breasts and No soreness of nipples  Psychiatric: Managing ok, no family except  who is working full time  Mental Health: No mention of feeling irritable, agitated, angry, overwhelmed, apathy, exhaustion nor having sleep changes outside infant feeds/demands or appetite changes       Objective:     Maternal Physical   General: No acute distress  Breasts: Symetrical , Plugged Duct - no evidence and Mastitis  - no S/S. Firm breasts, little elasticity.  Nipples: intact, soft, some eversion with stimulation  Psychiatric:  Normal mood and affect. Her behavior is normal. Judgment and thought content normal   Mental Health:  Did NOT exhibit sadness, crying, feeling overwhelmed, agitation or hypervigilance.     Assessment/Plan & Lactation Counseling:     Infant exam on infant chart    Infant Weight History:    21 7#14.6oz  21 7#10.8oz  2021 8#2.0oz  2021 8#8.5oz    Infant intake at Breast:: Left 0.3oz NS     Right 1.8oz    Total: 1.1oz  (63ml)  Milk Transfer at this feeding:   Effective breastfeeding   Pumped: Type of Pump: Platinum    Quantity Pumped: Total: 30ml transitional milk on day 17  Initiation of Feeding: Infant initiates  Position of Feeding:    Right: cross cradle  Left: cross cradle  Attachment Achieved: mom latched well and easily with NS  Nipple shield:     Size: 24mm       Introduced last visit  Latch achieved yes  Suck Pattern at the breast: Suck burst and normal rest  Suck Pattern on the bottle: Suck burst and normal rest, frequent pauses  Behavior Following Observed Feeding: content  Nipple Pain: None       Latch: Latch difficulty without nipple shield   Suckling/Feeding: attaches, baby fed effectively, baby roots, elicits CIARAN and rhythmic  Milk Supply Available: low  Low milk supply:   Likely due to: ineffective or infrequent breast stimulation or milk removal and breast surgery    Maternal Diagnosis/Problem:  Lactation suppression   Lactation problem, less pliable breast tissue    BREASTFEEDING PLAN  Discussed concerns and symptoms as listed above in assessment and guidance summarized below.  Topics reviewed included:  •  Herbs and medications for increasing supply and their potential side effects and efficacy. No evidence base exists to support their use  •  Triple feeding and its sustainability and its impact on the mother baby relationship  •  Maternal Mental Health: Discussed needing a group for support, intrusive thoughts, shared good moms have scary thoughts and discussed healthiness of having a  and normal concerns.   • Milk supply is dependent on glandular tissue development, hormonal influences, how many times the baby removes milk and how well the breasts are emptied in a 24 hour period. This is a biological reality that we can NOT work around. If, for any  reason, your baby is not latching, or you are not able to nurse, then it is important for you to remove the milk instead by pumping or hand expression.  There's no magic trick, tea, food, drink, cookie or supplement that will increase your milk supply. One  must  effectively remove milk to continue to make and maximize milk. In the early days and weeks that can be 8+ times in 24 hours. For older babies, on average 6-7 + times in 24 hours.      • Low Milk Supply: Causes  o Lack of nipple/breast stimulation (Baby at the breast but not suckling, mostly non nutritive sucking)  o Lack of breast emptying (Baby at the breast but no removing much milk)  •  Feeding:   o Feed your baby every 1.5-3 hours, more often if baby acts hungry.   o Awaken baby for feeding if going over 3 hours in the day.    o Need to get in 8-12 feedings per 24 hours.   o  Given infants weight you may allow baby to go longer at night but that generally means shorter durations in the day.    •  Supplement:   • Supplement with expressed milk  • Supplement with formula    •  Pacifier Use:  The [employer] Position Paper reports: Although we recommend a conservative approach regarding pacifier use, we do not endorse a complete ban on the use of pacifiers, nor do we support an approach that induces parental guilt concerning their choices about the use of pacifiers.    •  Nipple shield: We prefer the 24mm Medela. Before applying, roll shield in on itself and allow breast to be pulled  in to the shield tip.     •  Triple feeding: A short term solution: Breast/bottle/pump: MUST MOVE ON FROM THIS  o Written description provided.   o FIRST decide what you can do at that feeding and you may decide to double feed -pump and bottle, if you are going to offer the breast at that feeding:  - nurse first and limit time on the breasts to 20+/- min total  - finish with bottle  - pump afterwards to finish emptying your breasts which will help  increase milk supply  - use your own pumped milk, formula or donor human milk  - 30gm or ml = 1oz    •  Pumping Guidelines:  - Both breasts.  o Sustaining a healthy baseline prolactin level is vital- this means feeding/pumping at least every 3 hours with no more than a 5 hour break at night.   o Pump as you can, let your baby be the pump.  o Type of pump:  - [employer]  - http://[email or web address].myBestHelper/healthcare-professionals/videos/ameda-platinum-with-hygienikit-video  - Settings  Starts at 80, decrease speed to 60 when milk appears  - Suction to comfort  - Always double pump  o How long will vary woman to woman, typically 8-15 minutes, or 1-2 minutes after flow slows  o Flange Fit: Freely moving nipple in the tunnel with some movement of the areola.  - Today's evaluation indicates appropriate flange    • Storage (Acceptable guidelines for healthy term babies)  o 10 hours at room temp including your pieces, may rinse but not mandatory  o 8 days refrigerator, don't need  to refrigerate right away if using fresh pumped milk at the next feeding  o Freezer 1 year  o Deep freezer 2 years  o If baby drinks breastmilk from a fresh or refrigerated bottle of breastmilk,  you may return the unused portion to the refrigerator and use once at next feeding.     •  Connect with other mothers:  o Facebook:   - [place] Breastfeeds: [email or web address]  - Well-Nourished Babies (Private group for questions and support): https://www.facebook.com/groups/642221363272906/  o Breastfeeding [place]   - This is an emotional, informational and safe support [place] with your like-minded community that builds solidarity among moms  - The honest experiences of mothers are highly valued among the other mothers  - Tuesday  at 11am by Zoom,  you will be sent an invitation.   - You may instead copy and paste this link:    [email or web address]    - Passcode  [id number]  - You  may share this link with friends; please don't post on social media.      Follow up requires close monitoring in this time sensitive window of opportunity to establish milk supply and facilitate the learning of  breastfeeding.    Mom is encouraged to e-mail to update how the plan is working.  Follow-up for infant weight check and dyad breastfeeding evaluation as needed.  Please call [phone] if you have not scheduled your next appointment    A total of 60 minutes, not including infant assessment time, with more than 50% was spent preparing to see the patient, obtaining and reviewing separately obtained history, performing a medically appropriate examination and evaluation, counseling and educating the family, documenting clinical information in the electronic health record, independently interpreting weighted feeds and infant growth results, communicating these results to the family and care coordination as detailed in the above note.       JAREK Bertrand.

## 2021-08-24 ENCOUNTER — POST PARTUM (OUTPATIENT)
Dept: OBGYN | Facility: CLINIC | Age: 41
End: 2021-08-24
Payer: COMMERCIAL

## 2021-08-24 ENCOUNTER — HOSPITAL ENCOUNTER (OUTPATIENT)
Facility: MEDICAL CENTER | Age: 41
End: 2021-08-24
Attending: OBSTETRICS & GYNECOLOGY
Payer: COMMERCIAL

## 2021-08-24 DIAGNOSIS — Z12.4 CERVICAL CANCER SCREENING: ICD-10-CM

## 2021-08-24 DIAGNOSIS — Z30.017 ENCOUNTER FOR INITIAL PRESCRIPTION OF IMPLANTABLE SUBDERMAL CONTRACEPTIVE: ICD-10-CM

## 2021-08-24 DIAGNOSIS — Z30.017 NEXPLANON INSERTION: ICD-10-CM

## 2021-08-24 PROBLEM — O09.93 SUPERVISION OF HIGH RISK PREGNANCY IN THIRD TRIMESTER: Status: RESOLVED | Noted: 2021-01-14 | Resolved: 2021-08-24

## 2021-08-24 PROBLEM — O09.522 AMA (ADVANCED MATERNAL AGE) MULTIGRAVIDA 35+, SECOND TRIMESTER: Status: RESOLVED | Noted: 2021-01-14 | Resolved: 2021-08-24

## 2021-08-24 PROBLEM — O99.210 OBESITY IN PREGNANCY: Status: RESOLVED | Noted: 2021-01-14 | Resolved: 2021-08-24

## 2021-08-24 PROCEDURE — 90050 PR POSTPARTUM VISIT: CPT | Mod: 25 | Performed by: OBSTETRICS & GYNECOLOGY

## 2021-08-24 PROCEDURE — 11981 INSERTION DRUG DLVR IMPLANT: CPT | Performed by: OBSTETRICS & GYNECOLOGY

## 2021-08-24 PROCEDURE — 88175 CYTOPATH C/V AUTO FLUID REDO: CPT

## 2021-08-24 PROCEDURE — 87624 HPV HI-RISK TYP POOLED RSLT: CPT

## 2021-08-24 ASSESSMENT — EDINBURGH POSTNATAL DEPRESSION SCALE (EPDS)
I HAVE FELT SAD OR MISERABLE: NOT VERY OFTEN
THINGS HAVE BEEN GETTING ON TOP OF ME: NO, MOST OF THE TIME I HAVE COPED QUITE WELL
I HAVE BLAMED MYSELF UNNECESSARILY WHEN THINGS WENT WRONG: NOT VERY OFTEN
THE THOUGHT OF HARMING MYSELF HAS OCCURRED TO ME: NEVER
I HAVE BEEN SO UNHAPPY THAT I HAVE BEEN CRYING: ONLY OCCASIONALLY
TOTAL SCORE: 8
I HAVE BEEN ANXIOUS OR WORRIED FOR NO GOOD REASON: YES, SOMETIMES
I HAVE LOOKED FORWARD WITH ENJOYMENT TO THINGS: AS MUCH AS I EVER DID
I HAVE BEEN ABLE TO LAUGH AND SEE THE FUNNY SIDE OF THINGS: AS MUCH AS I ALWAYS COULD
I HAVE FELT SCARED OR PANICKY FOR NO GOOD REASON: NO, NOT MUCH
I HAVE BEEN SO UNHAPPY THAT I HAVE HAD DIFFICULTY SLEEPING: NOT VERY OFTEN

## 2021-08-24 NOTE — NON-PROVIDER
Pt here today for postpartum exam.  Delivery Date: 7/13/21 C/S  Currently: Breast feeding and bottle feeding   BCM: Pt interested in Nexplanon  Good ph: [phone]  Pt reports she is overwhelmed, pt denies any symptoms of post-partum depression

## 2021-08-24 NOTE — PROCEDURES
Procedure Note : Nexplanon Insertion :     Today I discussed with the patient subdermal implant, Nexplanon.  We discussed its mechanism of action and how it prevents pregnancy  Discussed that the implant is a progesterone only form of contraception.  Also discussed with patient risks associated with placement of the device which can include infection bruising and pain. We also discussed the possibility of the device can be displaced. We discussed that the device would be palpable under the skin of the arm.  I also discussed with the patient side effects of the device which can include but are not limited to, irregular bleeding which can include amenorrhea, irregular spotting and also worsening of menstrual cycles. There is an increased risk of headaches related to the device and potential for weight gain.  After thorough discussion the patient had opportunity to ask questions regarding the device and at this time desires the device to be placed today.  Patient information handout has been given for the device.    After informed consent and discussion of risks and benefits, patient who describes her dominant hand as right, was prepped and draped to expose the inner medial surface of the upper left arm  Insertion site marked at 8 cm distal to the medial epicondyl  1% lidocaine placed sub-Q along course of implant . Area totally anesthestized.   Nexplanon applicator placed with bevel of needle down and skin lifted and inserted to hub. Trigger fired and nexplanon released.  Implant palpated by myself and patient in proper location.   Small needle puncture hemostatic   Steri Strips placed   Arm wrapped with self sealing gauze     Patient given Postoperative Advice  Take NSAIDS and tylenol for pain, ice packs prn bruising/discomfort  Remove gauze wrap after 24 hrs, or at anytime it becomes uncomfortable   Steri Strips to be removed at 3-4 days  RTC as needed

## 2021-08-24 NOTE — PROGRESS NOTES
Subjective     Daria Fregoso is a 40 y.o. female who presents for postpartum exam            HPI patient is a 40-year-old  3 para 1-0-2-1 who presents today for 5-week postpartum exam status post primary  for arrest of dilatation. Patient is doing well currently. Reports normal bowel and bladder functions. Patient is bottlefeeding. Shedenies depression symptoms but states that once in a while she feels a little overwhelmed. Denies any suicidal or homicidal ideation. Does not want behavioral health counseling. Denies bleeding or spotting. Denies any abnormal discharge. Reports normal bowel and bladder functions.  Patient was on OCP and missed 3 days of OCP when she got pregnant. She desires Nexplanon for contraception    EPDS score 8    ROS all organ systems were reviewed and were negative except for complaints in HPI           Objective         Physical Exam  Vitals and nursing note reviewed. Exam conducted with a chaperone present.   Constitutional:       General: She is not in acute distress.     Appearance: Normal appearance. She is obese. She is not toxic-appearing.   HENT:      Head: Normocephalic and atraumatic.      Nose: Nose normal.   Eyes:      General: No scleral icterus.        Right eye: No discharge.         Left eye: No discharge.      Conjunctiva/sclera: Conjunctivae normal.   Cardiovascular:      Rate and Rhythm: Normal rate and regular rhythm.      Pulses: Normal pulses.      Heart sounds: Normal heart sounds. No murmur heard.   No gallop.    Pulmonary:      Effort: Pulmonary effort is normal. No respiratory distress.      Breath sounds: Normal breath sounds. No wheezing.   Chest:      Breasts:         Right: No swelling, bleeding, inverted nipple, mass, nipple discharge, skin change or tenderness.         Left: No swelling, bleeding, inverted nipple, mass, nipple discharge, skin change or tenderness.       Abdominal:      General: Abdomen is flat. Bowel sounds are normal.  There is no distension.      Palpations: Abdomen is soft.      Tenderness: There is no abdominal tenderness.      Comments: Incision is healed and nontender   Genitourinary:     General: Normal vulva.      Labia:         Right: No rash, tenderness, lesion or injury.         Left: No rash, tenderness, lesion or injury.       Urethra: No prolapse.      Vagina: No vaginal discharge, tenderness or bleeding.      Cervix: No cervical motion tenderness, friability or erythema.      Uterus: Not enlarged and not tender.       Adnexa:         Right: No mass, tenderness or fullness.          Left: No mass, tenderness or fullness.        Rectum: No external hemorrhoid.   Musculoskeletal:         General: No swelling. Normal range of motion.      Cervical back: Normal range of motion and neck supple. No rigidity.      Right lower leg: No edema.      Left lower leg: No edema.   Lymphadenopathy:      Cervical: No cervical adenopathy.      Upper Body:      Right upper body: No supraclavicular or axillary adenopathy.      Left upper body: No supraclavicular or axillary adenopathy.      Lower Body: No right inguinal adenopathy. No left inguinal adenopathy.   Skin:     General: Skin is warm and dry.      Coloration: Skin is not jaundiced.      Findings: No bruising.   Neurological:      General: No focal deficit present.      Mental Status: She is alert and oriented to person, place, and time.      Gait: Gait normal.   Psychiatric:         Mood and Affect: Mood normal.         Behavior: Behavior normal.         Thought Content: Thought content normal.         Judgment: Judgment normal.       Vital signs stable and patient is afebrile                  Discussion:    Discussed with patient today various forms of birth control available. We reviewed birth control pills, Depo-provera, NuvaRing, IUDs (both hormonal and non-hormonal), and Nexplanon.  Risks, benefits, and side effects to each method discussed in detail.  Also discussed with  patient the barrier methods (condoms) available for . and prevention of STDs.  After discussion of all the available methods, patient elects for Nexplanon implant.    Assessment & Plan        1. Postpartum examination following  delivery  40-year-old para 1 presenting for postpartum exam status post primary . Patient is doing well  Incision is healed and nontender. Patient can resume full activities without restrictions  Diet and exercise discussed  Self breast exam reviewed  Contraception And safe sex/backup contraception discussed    2. Cervical cancer screening  Pap smear. Screening recommendations reviewed  - THINPREP PAP WITH HPV; Future    3. Encounter for initial prescription of implantable subdermal contraceptive  Patient desires Nexplanon for contraception and I counseled patient on this device and she requested placement today. Nexplanon was placed    4. Precautions and plan of care were reviewed. Patient to follow-up for annual gynecologic exam and as needed for any gynecologic concerns.

## 2021-08-25 LAB
CYTOLOGY REG CYTOL: NORMAL
HPV HR 12 DNA CVX QL NAA+PROBE: NEGATIVE
HPV16 DNA SPEC QL NAA+PROBE: NEGATIVE
HPV18 DNA SPEC QL NAA+PROBE: NEGATIVE
SPECIMEN SOURCE: NORMAL

## 2021-10-01 ENCOUNTER — GYNECOLOGY VISIT (OUTPATIENT)
Dept: OBGYN | Facility: CLINIC | Age: 41
End: 2021-10-01
Payer: COMMERCIAL

## 2021-10-01 DIAGNOSIS — N94.10 DYSPAREUNIA IN FEMALE: ICD-10-CM

## 2021-10-01 DIAGNOSIS — R35.0 FREQUENT URINATION: ICD-10-CM

## 2021-10-01 DIAGNOSIS — N94.10 DYSPAREUNIA, FEMALE: ICD-10-CM

## 2021-10-01 DIAGNOSIS — R31.9 URINARY TRACT INFECTION WITH HEMATURIA, SITE UNSPECIFIED: ICD-10-CM

## 2021-10-01 DIAGNOSIS — N39.0 URINARY TRACT INFECTION WITH HEMATURIA, SITE UNSPECIFIED: ICD-10-CM

## 2021-10-01 LAB
APPEARANCE UR: CLEAR
BILIRUB UR STRIP-MCNC: NORMAL MG/DL
COLOR UR AUTO: NORMAL
GLUCOSE UR STRIP.AUTO-MCNC: NORMAL MG/DL
KETONES UR STRIP.AUTO-MCNC: NORMAL MG/DL
LEUKOCYTE ESTERASE UR QL STRIP.AUTO: NORMAL
NITRITE UR QL STRIP.AUTO: POSITIVE
PH UR STRIP.AUTO: 5.5 [PH] (ref 5–8)
PROT UR QL STRIP: NORMAL MG/DL
RBC UR QL AUTO: NORMAL
SP GR UR STRIP.AUTO: 1.02
UROBILINOGEN UR STRIP-MCNC: NORMAL MG/DL

## 2021-10-01 PROCEDURE — 81002 URINALYSIS NONAUTO W/O SCOPE: CPT | Performed by: OBSTETRICS & GYNECOLOGY

## 2021-10-01 PROCEDURE — 99213 OFFICE O/P EST LOW 20 MIN: CPT | Performed by: OBSTETRICS & GYNECOLOGY

## 2021-10-01 RX ORDER — NITROFURANTOIN 25; 75 MG/1; MG/1
100 CAPSULE ORAL 2 TIMES DAILY
Qty: 14 CAPSULE | Refills: 0 | Status: SHIPPED | OUTPATIENT
Start: 2021-10-01

## 2021-10-01 NOTE — PROGRESS NOTES
GYN visit    CC/reason for visit: burning with urination and discomfort with sex    HPI: Daria Fregoso is a 40 y.o.  with burning with urination and pain with intercourse. States pain is worse with him from behind but better when he is on top. She is using lubrication.     ROS:  Reviewed and negative x 10 with pertinent positives listed in HPI above         OB history:    OB History    Para Term  AB Living   3 1 1   2 1   SAB TAB Ectopic Molar Multiple Live Births   1 1     0 1      # Outcome Date GA Lbr Tristan/2nd Weight Sex Delivery Anes PTL Lv   3 Term 21 39w2d  3.59 kg (7 lb 14.6 oz) M CS-LTranv EPI N SHIMON      Complications: Fetal Intolerance, Failure to Progress in First Stage, Primigravida of advanced maternal age in third trimester   2 TAB            1 SAB                History reviewed. No pertinent past medical history.    Past Surgical History:   Procedure Laterality Date   • PRIMARY C SECTION Bilateral 2021    Procedure:  SECTION, PRIMARY;  Surgeon: Pepe Torrez M.D.;  Location[facility];  Service: Labor and Delivery   • APPENDECTOMY     • LIPOSUCTION     • MAMMOPLASTY AUGMENTATION     • NASAL RECONSTRUCTION         Medications:   Current Outpatient Medications Ordered in Epic   Medication Sig Dispense Refill   • nitrofurantoin (MACROBID) 100 MG Cap Take 1 Capsule by mouth 2 times a day. 14 Capsule 0   • ibuprofen (MOTRIN) 600 MG Tab Take 1 tablet by mouth every 6 hours as needed for Moderate Pain (For cramping after delivery; do not give if patient is receiving ketorolac (Toradol)). 30 tablet 0   • simethicone (MYLICON) 80 MG Chew Tab Chew 1 tablet 4 times a day as needed (for Abdominal Distention). 30 tablet 3   • acetaminophen (TYLENOL) 325 MG Tab Take 650 mg by mouth every four hours as needed.     • aspirin EC (ECOTRIN) 81 MG Tablet Delayed Response Take 81 mg by mouth every day.     • Prenatal MV-Min-Fe Fum-FA-DHA (PRENATAL 1  PO) Take  by mouth.       No current Epic-ordered facility-administered medications on file.       Allergies: Patient has no known allergies.    Social History     Socioeconomic History   • Marital status:      Spouse name: Not on file   • Number of children: Not on file   • Years of education: Not on file   • Highest education level: Not on file   Occupational History   • Not on file   Tobacco Use   • Smoking status: Never Smoker   • Smokeless tobacco: Never Used   Vaping Use   • Vaping Use: Never used   Substance and Sexual Activity   • Alcohol use: Not Currently     Alcohol/week: 0.5 oz     Types: 1 Glasses of wine per week   • Drug use: No   • Sexual activity: Yes     Partners: Male   Other Topics Concern   • Not on file   Social History Narrative   • Not on file     Social Determinants of Health     Financial Resource Strain:    • Difficulty of Paying Living Expenses:    Food Insecurity:    • Worried About Running Out of Food in the Last Year:    • Ran Out of Food in the Last Year:    Transportation Needs:    • Lack of Transportation (Medical):    • Lack of Transportation (Non-Medical):    Physical Activity:    • Days of Exercise per Week:    • Minutes of Exercise per Session:    Stress:    • Feeling of Stress :    Social Connections:    • Frequency of Communication with Friends and Family:    • Frequency of Social Gatherings with Friends and Family:    • Attends Jewish Services:    • Active Member of Clubs or Organizations:    • Attends Club or Organization Meetings:    • Marital Status:    Intimate Partner Violence:    • Fear of Current or Ex-Partner:    • Emotionally Abused:    • Physically Abused:    • Sexually Abused:        Family History   Problem Relation Age of Onset   • Hypertension Mother    • Hyperlipidemia Mother    • Hypertension Father    • Diabetes Father    • Hyperlipidemia Father          Physical Exam:  There were no vitals taken for this visit.  gen: AAO, NAD, affect  appropriate  CV: No edema, cyanosis, or clubbing  Resp: Symmetric non labored breathing  Breast: symmetric, no skin changes, no masses, nontender, no nipple discharge, no lymphadenopathy  Abd: soft, NT, ND, no masses, no organomegaly, no hernias  : NEFG, normal urethral meatus, normal anus/perineum, normal vagina and cervix.  Uterus midline, anteverted, no adnexal masses/tenderness. Some tenderness to palpation at the perineum on the left.  Skin: warm/dry, no lesions    A/P: 40 y.o.  with   1. Frequent urination  POCT Urinalysis    CANCELED: POCT Pregnancy   2. Dyspareunia, female     3. Urinary tract infection with hematuria, site unspecified  nitrofurantoin (MACROBID) 100 MG Cap   4. Dyspareunia in female  REFERRAL TO PHYSICAL THERAPY

## 2021-10-01 NOTE — NON-PROVIDER
Pt here for Gyn visit  Good Phone#: [phone]  Last pap:8/24/2021, NEG  Last mammo:10/9/2019, NEG  Pharmacy verified.  Pt states has been having frequent urination and px after urinating x 3d. Pt states she has been taking AZO but not working at all.  Pt states no other complaints for today.

## 2024-07-25 ENCOUNTER — APPOINTMENT (OUTPATIENT)
Dept: MEDICAL GROUP | Facility: PHYSICIAN GROUP | Age: 44
End: 2024-07-25
Payer: MEDICAID

## 2024-08-06 ENCOUNTER — APPOINTMENT (OUTPATIENT)
Dept: MEDICAL GROUP | Facility: MEDICAL CENTER | Age: 44
End: 2024-08-06
Payer: MEDICAID

## 2024-08-06 ENCOUNTER — OFFICE VISIT (OUTPATIENT)
Dept: MEDICAL GROUP | Facility: MEDICAL CENTER | Age: 44
End: 2024-08-06
Payer: MEDICAID

## 2024-08-06 VITALS
HEIGHT: 68 IN | HEART RATE: 82 BPM | TEMPERATURE: 97.9 F | SYSTOLIC BLOOD PRESSURE: 120 MMHG | RESPIRATION RATE: 16 BRPM | WEIGHT: 227 LBS | OXYGEN SATURATION: 97 % | DIASTOLIC BLOOD PRESSURE: 70 MMHG | BODY MASS INDEX: 34.4 KG/M2

## 2024-08-06 DIAGNOSIS — Z13.29 SCREENING FOR ENDOCRINE, NUTRITIONAL, METABOLIC AND IMMUNITY DISORDER: ICD-10-CM

## 2024-08-06 DIAGNOSIS — Z13.21 SCREENING FOR ENDOCRINE, NUTRITIONAL, METABOLIC AND IMMUNITY DISORDER: ICD-10-CM

## 2024-08-06 DIAGNOSIS — Z12.31 ENCOUNTER FOR SCREENING MAMMOGRAM FOR MALIGNANT NEOPLASM OF BREAST: ICD-10-CM

## 2024-08-06 DIAGNOSIS — Z12.12 SCREENING FOR COLORECTAL CANCER: ICD-10-CM

## 2024-08-06 DIAGNOSIS — F41.9 ANXIETY: ICD-10-CM

## 2024-08-06 DIAGNOSIS — Z13.0 SCREENING FOR ENDOCRINE, NUTRITIONAL, METABOLIC AND IMMUNITY DISORDER: ICD-10-CM

## 2024-08-06 DIAGNOSIS — Z12.11 SCREENING FOR COLORECTAL CANCER: ICD-10-CM

## 2024-08-06 DIAGNOSIS — Z13.228 SCREENING FOR ENDOCRINE, NUTRITIONAL, METABOLIC AND IMMUNITY DISORDER: ICD-10-CM

## 2024-08-06 PROCEDURE — 99203 OFFICE O/P NEW LOW 30 MIN: CPT

## 2024-08-06 PROCEDURE — 99213 OFFICE O/P EST LOW 20 MIN: CPT

## 2024-08-06 RX ORDER — HYDROXYZINE HYDROCHLORIDE 25 MG/1
25 TABLET, FILM COATED ORAL 3 TIMES DAILY PRN
Qty: 30 TABLET | Refills: 0 | Status: SHIPPED | OUTPATIENT
Start: 2024-08-06

## 2024-08-06 ASSESSMENT — PATIENT HEALTH QUESTIONNAIRE - PHQ9: CLINICAL INTERPRETATION OF PHQ2 SCORE: 0
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
## 2024-08-19 ENCOUNTER — HOSPITAL ENCOUNTER (OUTPATIENT)
Dept: LAB | Facility: MEDICAL CENTER | Age: 44
End: 2024-08-19
Payer: MEDICAID

## 2024-08-19 DIAGNOSIS — Z13.228 SCREENING FOR ENDOCRINE, NUTRITIONAL, METABOLIC AND IMMUNITY DISORDER: ICD-10-CM

## 2024-08-19 DIAGNOSIS — Z13.29 SCREENING FOR ENDOCRINE, NUTRITIONAL, METABOLIC AND IMMUNITY DISORDER: ICD-10-CM

## 2024-08-19 DIAGNOSIS — Z13.21 SCREENING FOR ENDOCRINE, NUTRITIONAL, METABOLIC AND IMMUNITY DISORDER: ICD-10-CM

## 2024-08-19 DIAGNOSIS — Z13.0 SCREENING FOR ENDOCRINE, NUTRITIONAL, METABOLIC AND IMMUNITY DISORDER: ICD-10-CM

## 2024-08-19 LAB
25(OH)D3 SERPL-MCNC: 32 NG/ML (ref 30–100)
ALBUMIN SERPL BCP-MCNC: 4.1 G/DL (ref 3.2–4.9)
ALBUMIN/GLOB SERPL: 1.4 G/DL
ALP SERPL-CCNC: 62 U/L (ref 30–99)
ALT SERPL-CCNC: 19 U/L (ref 2–50)
ANION GAP SERPL CALC-SCNC: 12 MMOL/L (ref 7–16)
AST SERPL-CCNC: 15 U/L (ref 12–45)
BILIRUB SERPL-MCNC: 0.6 MG/DL (ref 0.1–1.5)
BUN SERPL-MCNC: 14 MG/DL (ref 8–22)
CALCIUM ALBUM COR SERPL-MCNC: 9.1 MG/DL (ref 8.5–10.5)
CALCIUM SERPL-MCNC: 9.2 MG/DL (ref 8.5–10.5)
CHLORIDE SERPL-SCNC: 103 MMOL/L (ref 96–112)
CHOLEST SERPL-MCNC: 189 MG/DL (ref 100–199)
CO2 SERPL-SCNC: 25 MMOL/L (ref 20–33)
CREAT SERPL-MCNC: 0.62 MG/DL (ref 0.5–1.4)
ERYTHROCYTE [DISTWIDTH] IN BLOOD BY AUTOMATED COUNT: 41.7 FL (ref 35.9–50)
EST. AVERAGE GLUCOSE BLD GHB EST-MCNC: 117 MG/DL
FASTING STATUS PATIENT QL REPORTED: NORMAL
GFR SERPLBLD CREATININE-BSD FMLA CKD-EPI: 111 ML/MIN/1.73 M 2
GLOBULIN SER CALC-MCNC: 3 G/DL (ref 1.9–3.5)
GLUCOSE SERPL-MCNC: 94 MG/DL (ref 65–99)
HBA1C MFR BLD: 5.7 % (ref 4–5.6)
HCT VFR BLD AUTO: 44.5 % (ref 37–47)
HCV AB SER QL: NORMAL
HDLC SERPL-MCNC: 48 MG/DL
HGB BLD-MCNC: 14.7 G/DL (ref 12–16)
LDLC SERPL CALC-MCNC: 111 MG/DL
MCH RBC QN AUTO: 29 PG (ref 27–33)
MCHC RBC AUTO-ENTMCNC: 33 G/DL (ref 32.2–35.5)
MCV RBC AUTO: 87.8 FL (ref 81.4–97.8)
PLATELET # BLD AUTO: 349 K/UL (ref 164–446)
PMV BLD AUTO: 10.2 FL (ref 9–12.9)
POTASSIUM SERPL-SCNC: 4.1 MMOL/L (ref 3.6–5.5)
PROT SERPL-MCNC: 7.1 G/DL (ref 6–8.2)
RBC # BLD AUTO: 5.07 M/UL (ref 4.2–5.4)
SODIUM SERPL-SCNC: 140 MMOL/L (ref 135–145)
TRIGL SERPL-MCNC: 149 MG/DL (ref 0–149)
TSH SERPL DL<=0.005 MIU/L-ACNC: 1.35 UIU/ML (ref 0.38–5.33)
WBC # BLD AUTO: 8.5 K/UL (ref 4.8–10.8)

## 2024-08-19 PROCEDURE — 80053 COMPREHEN METABOLIC PANEL: CPT

## 2024-08-19 PROCEDURE — 86803 HEPATITIS C AB TEST: CPT

## 2024-08-19 PROCEDURE — 85027 COMPLETE CBC AUTOMATED: CPT

## 2024-08-19 PROCEDURE — 82306 VITAMIN D 25 HYDROXY: CPT

## 2024-08-19 PROCEDURE — 83036 HEMOGLOBIN GLYCOSYLATED A1C: CPT

## 2024-08-19 PROCEDURE — 84443 ASSAY THYROID STIM HORMONE: CPT

## 2024-08-19 PROCEDURE — 80061 LIPID PANEL: CPT

## 2024-08-19 PROCEDURE — 36415 COLL VENOUS BLD VENIPUNCTURE: CPT

## 2024-08-28 ENCOUNTER — HOSPITAL ENCOUNTER (OUTPATIENT)
Facility: MEDICAL CENTER | Age: 44
End: 2024-08-28
Payer: MEDICAID

## 2024-08-28 ENCOUNTER — OFFICE VISIT (OUTPATIENT)
Dept: MEDICAL GROUP | Facility: MEDICAL CENTER | Age: 44
End: 2024-08-28
Payer: MEDICAID

## 2024-08-28 VITALS
DIASTOLIC BLOOD PRESSURE: 70 MMHG | TEMPERATURE: 98.3 F | OXYGEN SATURATION: 97 % | RESPIRATION RATE: 16 BRPM | HEART RATE: 83 BPM | BODY MASS INDEX: 35.06 KG/M2 | WEIGHT: 230.6 LBS | SYSTOLIC BLOOD PRESSURE: 110 MMHG

## 2024-08-28 DIAGNOSIS — Z01.419 WELL WOMAN EXAM: ICD-10-CM

## 2024-08-28 DIAGNOSIS — R32 URINARY INCONTINENCE, UNSPECIFIED TYPE: ICD-10-CM

## 2024-08-28 PROCEDURE — 87510 GARDNER VAG DNA DIR PROBE: CPT

## 2024-08-28 PROCEDURE — 88175 CYTOPATH C/V AUTO FLUID REDO: CPT

## 2024-08-28 PROCEDURE — 87480 CANDIDA DNA DIR PROBE: CPT

## 2024-08-28 PROCEDURE — 99213 OFFICE O/P EST LOW 20 MIN: CPT

## 2024-08-28 PROCEDURE — 87660 TRICHOMONAS VAGIN DIR PROBE: CPT

## 2024-08-28 ASSESSMENT — FIBROSIS 4 INDEX: FIB4 SCORE: 0.44

## 2024-08-28 NOTE — PROGRESS NOTES
Subjective:     CC:   Chief Complaint   Patient presents with    Gynecologic Exam       HPI:   Daria Fregoso is a 43 y.o. female who presents for annual exam. She is feeling well and denies any complaints.    Ob-Gyn/ History:    Patient has GYN provider: no  /Para:  1/2  Last Pap Smear:  . No history of abnormal pap smears.  Gyn Surgery:  C section.  Current Contraceptive Method:  Nexplanon. Not currently sexually active.  Last menstrual period:  spotting.  Periods irregular. light bleeding. Cramping is mild.   She does not take OTC analgesics for cramps.  No significant bloating/fluid retention, pelvic pain, or dyspareunia. No vaginal discharge  Post-menopausal bleeding: na  Urinary incontinence: yes    Health Maintenance  Cholesterol Screening: completed   Diabetes Screening: completed  Aspirin Use: none     Anticipatory Guidance  Diet: discussed   Exercise: discussed   Substance Abuse: none  Safe in relationship.  Seat belts, bike helmet, gun safety discussed.  Sun protection used.  Dental Home.    Cancer screening  Colorectal Cancer Screening: ordered    Lung Cancer Screening: na    Cervical Cancer Screening: completed   Breast Cancer Screening: scheduled    Infectious disease screening/Immunizations  --STI Screening: completed today  --Practices safe sex.  --HIV Screening: UTD   --Hepatitis C Screening: UTD  --Immunizations:    Influenza: na    HPV:  na    Tetanus: utd    Shingles: n/a    Pneumococcal :          She  has a past medical history of Anxiety (2024).    She has no past medical history of Addisons disease ([facility]), Adrenal disorder ([facility]), Allergy, Anemia, Arthritis, Asthma, Blood transfusion without reported diagnosis, Cancer ([facility]), Cataract, CHF (congestive heart failure) ([facility]), Clotting disorder ([facility]), COPD (chronic obstructive pulmonary disease) ([facility]), Cushings syndrome ([facility]), Depression, Diabetes ([facility]), Diabetic neuropathy ([facility]), GERD (gastroesophageal reflux disease),  Glaucoma, Goiter, Head ache, Heart attack (HCC), Heart murmur, HIV (human immunodeficiency virus infection) (HCC), Hypertension, IBD (inflammatory bowel disease), Kidney disease, Meningitis, Migraine, Muscle disorder, Osteoporosis, Parathyroid disorder (HCC), Pituitary disease (HCC), Pulmonary emphysema (HCC), Seizure (HCC), Sickle cell disease (HCC), Stroke (HCC), Substance abuse (HCC), Thyroid disease, Tuberculosis, or Urinary tract infection.  She  has a past surgical history that includes liposuction; nasal reconstruction; mammoplasty augmentation; appendectomy; and primary c section (Bilateral, 7/13/2021).    Family History   Problem Relation Age of Onset    Hypertension Mother     Hyperlipidemia Mother     Hypertension Father     Diabetes Father     Hyperlipidemia Father        Social History     Socioeconomic History    Marital status:      Spouse name: Not on file    Number of children: Not on file    Years of education: Not on file    Highest education level: Not on file   Occupational History    Not on file   Tobacco Use    Smoking status: Never    Smokeless tobacco: Never   Vaping Use    Vaping status: Never Used   Substance and Sexual Activity    Alcohol use: Yes     Alcohol/week: 0.5 oz     Types: 1 Glasses of wine per week     Comment: 2 times a month glass of wine    Drug use: Never    Sexual activity: Yes     Partners: Male   Other Topics Concern    Not on file   Social History Narrative    Not on file     Social Determinants of Health     Financial Resource Strain: Not on file   Food Insecurity: Not on file   Transportation Needs: Not on file   Physical Activity: Not on file   Stress: Not on file   Social Connections: Not on file   Intimate Partner Violence: Not on file   Housing Stability: Not on file       Patient Active Problem List    Diagnosis Date Noted    Anxiety 08/06/2024    Arthralgia of right elbow 01/31/2020    Hyperlipidemia, unspecified 01/31/2020    Arthralgia of hand  10/04/2019         Current Outpatient Medications   Medication Sig Dispense Refill    hydrOXYzine HCl (ATARAX) 25 MG Tab Take 1 Tablet by mouth 3 times a day as needed for Anxiety. 30 Tablet 0     No current facility-administered medications for this visit.     No Known Allergies    Review of Systems   Constitutional: Negative for fever, chills and malaise/fatigue.   HENT: Negative for congestion.    Eyes: Negative for pain.    Respiratory: Negative for cough and shortness of breath.  Cardiovascular: Negative for leg swelling.   Gastrointestinal: Negative for nausea, vomiting, abdominal pain and diarrhea.   Genitourinary: Negative for dysuria and hematuria.   Skin: Negative for rash.   Neurological: Negative for dizziness, focal weakness and headaches.   Endo/Heme/Allergies: Does not bleed easily.   Psychiatric/Behavioral: Negative for depression.  The patient is not nervous/anxious.      Objective:     /70 (BP Location: Right arm, Patient Position: Sitting, BP Cuff Size: Adult)   Pulse 83   Temp 36.8 °C (98.3 °F) (Temporal)   Resp 16   Wt 105 kg (230 lb 9.6 oz)   SpO2 97%   BMI 35.06 kg/m²   Body mass index is 35.06 kg/m².  Wt Readings from Last 4 Encounters:   08/28/24 105 kg (230 lb 9.6 oz)   08/06/24 103 kg (227 lb)   07/27/21 89.8 kg (198 lb)   07/11/21 101 kg (223 lb)       Physical Exam:  Constitutional: Well-developed and well-nourished. Not diaphoretic. No distress.   Skin: Skin is warm and dry. No rash noted.  Head: Atraumatic without lesions.  Eyes: Conjunctivae and extraocular motions are normal. Pupils are equal, round, and reactive to light. No scleral icterus.   Ears:  External ears unremarkable. Tympanic membranes clear and intact.  Nose: Nares patent. Septum midline. Turbinates without erythema nor edema. No discharge.   Mouth/Throat: Tongue normal. Oropharynx is clear and moist.   Neck: Supple, trachea midline. Normal range of motion. No thyromegaly present. No lymphadenopathy--cervical  or supraclavicular.  Cardiovascular: Regular rate and rhythm, S1 and S2 without murmur, rubs, or gallops.  Lungs: Normal inspiratory effort, CTA bilaterally, no wheezes/rhonchi/rales  Breast: Breasts exam deferred.  Abdomen: Soft, non tender, and without distention. Active bowel sounds in all four quadrants. No rebound, guarding, masses or HSM.  :Perineum and external genitalia normal without rash. Vagina with normal and physiologic discharge. Cervix without visible lesions or discharge.   Extremities: No cyanosis, clubbing, erythema, nor edema.    Musculoskeletal: All major joints AROM full in all directions without pain.  Neurological: Alert and oriented x 3. No cranial nerve deficit. Sensation intact.   Psychiatric:  Behavior, mood, and affect are appropriate.    A chaperone was offered to the patient during today's exam.: Patient declined.    Assessment and Plan:     1. Well woman exam  Preventative Medicine / HCM visit with no emergent concerns.  - Recommended yearly HCM visits  - Discussed importance of healthy diet and exercise (5x/week, 20-30 minutes of sustained cardiovascular training)  -Advised that pt should take 400 - 800 mcg of folic acid per day.  -Advised on maintaining routine vaccinations  -Will contact with abnormal lab results.  -Advised pt to wear helmets. seatbelt and sunscreen as discussed; continue safer sex practices  - Anticipatory guidance including:  Exercise:   - Try for at least 150 minutes of cardiovascular (strenuous) exercise per week.   Safety:   - Wear your seat belt at all times when in a car and NO TEXTING/CELL PHONES while driving.   - Wear a helmet while biking, using a motorcycle, skiing/snow boarding, skate/long boarding, or any activities faster than running.   - Avoid smoking.   - If you have a gun it needs to be locked up and stored unloaded away from children.   Nutrition:   - Drink enough water so that urine is light yellow/clear  - Try to avoid alcoholic drinks; or  "consume no more than 2 alcoholic drinks per day for men. No more than 1 alcoholic drink per day for woman.   - Read labels for calories   - Use website \"My Fitness Pal\" for free calorie counting tool when possible.   Stress:   - Make time for activities that allow you to decrease stress and recognize any red flags of stress for you to know when to activate your stress release mechanisms.   Sleep:   - Try to get 7-9 hours of sleep each night.   Preventative Care:   - Follow-up yearly for your annual exams   - Pap smears start at age 21 repeat every 3-5 years depending on age and results until age 64  - Colon cancer screening starting at age 45 until age 75  - Mammograms for breast cancer screening every 1-2 years can start at age 40; general recommendation is biennial screening mammography for women aged 50 to 74 years  - Annual flu vaccination   - Bone density screening at age 65  - Pneumonia vaccination at age 65   - Shingles vaccination at age 50  - VAGINAL PATHOGENS DNA PANEL  - THINPREP PAP W/HPV AND CTNG; Future  - VAGINAL PATHOGENS DNA PANEL; Future    2. Urinary incontinence, unspecified type  Chronic issue, since the birth of her child.  Patient is interested in following up with urogynecology for evaluation and management.  - Referral to Urogynecology      HCM:  completed to patients preferences.    Labs per orders  Immunizations per orders  Patient counseled about skin care, diet, supplements, prenatal vitamins, safe sex and exercise.      Follow-up: Return in about 4 weeks (around 9/25/2024), or Nexplanon removal.      "

## 2024-08-29 DIAGNOSIS — N76.0 BV (BACTERIAL VAGINOSIS): ICD-10-CM

## 2024-08-29 DIAGNOSIS — B96.89 BV (BACTERIAL VAGINOSIS): ICD-10-CM

## 2024-08-29 DIAGNOSIS — Z01.419 WELL WOMAN EXAM: ICD-10-CM

## 2024-08-29 RX ORDER — METRONIDAZOLE 500 MG/1
500 TABLET ORAL 2 TIMES DAILY
Qty: 14 TABLET | Refills: 0 | Status: SHIPPED | OUTPATIENT
Start: 2024-08-29 | End: 2024-09-05

## 2024-09-01 LAB
C TRACH RRNA CVX QL NAA+PROBE: NEGATIVE
CYTOLOGIST CVX/VAG CYTO: NORMAL
CYTOLOGY CVX/VAG DOC CYTO: NORMAL
CYTOLOGY CVX/VAG DOC THIN PREP: NORMAL
HPV I/H RISK 4 DNA CVX QL PROBE+SIG AMP: NEGATIVE
N GONORRHOEA RRNA CVX QL NAA+PROBE: NEGATIVE
NOTE NL11727A: NORMAL
OTHER STN SPEC: NORMAL
STAT OF ADQ CVX/VAG CYTO-IMP: NORMAL

## 2024-09-05 ENCOUNTER — HOSPITAL ENCOUNTER (OUTPATIENT)
Dept: RADIOLOGY | Facility: MEDICAL CENTER | Age: 44
End: 2024-09-05
Payer: MEDICAID

## 2024-09-09 ENCOUNTER — HOSPITAL ENCOUNTER (OUTPATIENT)
Dept: RADIOLOGY | Facility: MEDICAL CENTER | Age: 44
End: 2024-09-09
Payer: MEDICAID

## 2024-09-09 DIAGNOSIS — Z12.31 ENCOUNTER FOR SCREENING MAMMOGRAM FOR MALIGNANT NEOPLASM OF BREAST: ICD-10-CM

## 2024-09-09 PROCEDURE — 77067 SCR MAMMO BI INCL CAD: CPT

## 2024-10-01 ENCOUNTER — OFFICE VISIT (OUTPATIENT)
Dept: MEDICAL GROUP | Facility: MEDICAL CENTER | Age: 44
End: 2024-10-01
Attending: FAMILY MEDICINE
Payer: MEDICAID

## 2024-10-01 VITALS
HEIGHT: 68 IN | SYSTOLIC BLOOD PRESSURE: 100 MMHG | HEART RATE: 85 BPM | OXYGEN SATURATION: 99 % | TEMPERATURE: 97.3 F | DIASTOLIC BLOOD PRESSURE: 70 MMHG | RESPIRATION RATE: 14 BRPM | BODY MASS INDEX: 35.01 KG/M2 | WEIGHT: 231 LBS

## 2024-10-01 DIAGNOSIS — Z30.011 ENCOUNTER FOR INITIAL PRESCRIPTION OF CONTRACEPTIVE PILLS: ICD-10-CM

## 2024-10-01 DIAGNOSIS — Z30.46 NEXPLANON REMOVAL: ICD-10-CM

## 2024-10-01 PROCEDURE — 99213 OFFICE O/P EST LOW 20 MIN: CPT | Mod: 25 | Performed by: FAMILY MEDICINE

## 2024-10-01 PROCEDURE — 99213 OFFICE O/P EST LOW 20 MIN: CPT | Performed by: FAMILY MEDICINE

## 2024-10-01 PROCEDURE — 3074F SYST BP LT 130 MM HG: CPT | Performed by: FAMILY MEDICINE

## 2024-10-01 PROCEDURE — 3078F DIAST BP <80 MM HG: CPT | Performed by: FAMILY MEDICINE

## 2024-10-01 PROCEDURE — 700101 HCHG RX REV CODE 250: Mod: UD

## 2024-10-01 PROCEDURE — 11982 REMOVE DRUG IMPLANT DEVICE: CPT | Performed by: FAMILY MEDICINE

## 2024-10-01 RX ORDER — NORETHINDRONE ACETATE AND ETHINYL ESTRADIOL .02; 1 MG/1; MG/1
1 TABLET ORAL DAILY
Qty: 84 TABLET | Refills: 3 | Status: SHIPPED | OUTPATIENT
Start: 2024-10-01

## 2024-10-01 RX ADMIN — LIDOCAINE HYDROCHLORIDE 10 ML: 10; .005 INJECTION, SOLUTION EPIDURAL; INFILTRATION; INTRACAUDAL; PERINEURAL at 17:21

## 2024-10-01 ASSESSMENT — FIBROSIS 4 INDEX: FIB4 SCORE: 0.44

## 2025-01-07 ENCOUNTER — OFFICE VISIT (OUTPATIENT)
Dept: MEDICAL GROUP | Facility: MEDICAL CENTER | Age: 45
End: 2025-01-07
Payer: MEDICAID

## 2025-01-07 VITALS
DIASTOLIC BLOOD PRESSURE: 70 MMHG | WEIGHT: 230 LBS | OXYGEN SATURATION: 97 % | RESPIRATION RATE: 14 BRPM | SYSTOLIC BLOOD PRESSURE: 116 MMHG | HEART RATE: 85 BPM | BODY MASS INDEX: 34.86 KG/M2 | TEMPERATURE: 97.3 F | HEIGHT: 68 IN

## 2025-01-07 DIAGNOSIS — G43.109 MIGRAINE WITH AURA AND WITHOUT STATUS MIGRAINOSUS, NOT INTRACTABLE: ICD-10-CM

## 2025-01-07 DIAGNOSIS — H53.8 BLURRED VISION: ICD-10-CM

## 2025-01-07 DIAGNOSIS — I83.93 ASYMPTOMATIC VARICOSE VEINS OF BOTH LOWER EXTREMITIES: ICD-10-CM

## 2025-01-07 DIAGNOSIS — R73.03 PREDIABETES: ICD-10-CM

## 2025-01-07 PROCEDURE — 3078F DIAST BP <80 MM HG: CPT

## 2025-01-07 PROCEDURE — 99213 OFFICE O/P EST LOW 20 MIN: CPT

## 2025-01-07 PROCEDURE — 99214 OFFICE O/P EST MOD 30 MIN: CPT

## 2025-01-07 PROCEDURE — 3074F SYST BP LT 130 MM HG: CPT

## 2025-01-07 RX ORDER — SUMATRIPTAN SUCCINATE 25 MG/1
25-50 TABLET ORAL
Qty: 15 TABLET | Refills: 0 | Status: SHIPPED | OUTPATIENT
Start: 2025-01-07 | End: 2025-02-06

## 2025-01-07 ASSESSMENT — FIBROSIS 4 INDEX: FIB4 SCORE: 0.45

## 2025-01-07 NOTE — PROGRESS NOTES
Verbal consent was acquired by the patient to use [employer] ambient listening note generation during this visit     Subjective:     CC:  Diagnoses of Blurred vision, Migraine with aura and without status migrainosus, not intractable, Asymptomatic varicose veins of both lower extremities, and Prediabetes were pertinent to this visit.    HISTORY OF THE PRESENT ILLNESS: Patient is a 44 y.o. female.     History of Present Illness  The patient presents for evaluation of blurred vision, headaches, and varicose veins.    She reports experiencing episodes of bilateral blurred vision, characterized by dark spots in the peripheral field that gradually obscure her entire visual field. These episodes, which she can anticipate due to the onset of blurriness, typically last between 20 to 30 minutes and occur approximately once a month. She does not experience any associated changes in heart rate or breathing patterns. She also reports concurrent headaches but does not recall any forehead tension. She does not experience nausea during these episodes but reports a sensation of imbalance. She has experienced photophobia during one episode but does not report any phonophobia. She is uncertain about the presence of slurred speech as she refrains from speaking during these episodes. She has been experiencing these symptoms for the past 3 years, with an increase in frequency over the last year. She is not currently on any birth control medication. She expresses concern about the possibility of these symptoms being indicative of a stroke. She does not have any personal history of blood clots. These episodes are debilitating, often necessitating cessation of activities such as driving until the symptoms subside. She has not sought pharmacological intervention for these episodes.    She reports a decrease in the frequency of her headaches since her last visit, attributing this improvement to her recent divorce. However, she notes that  the headaches are intermittent and can be triggered by stress. She experiences jaw tightness when stressed, for which she uses a dental appliance at night. She occasionally takes Tylenol 600 for her headaches but finds it ineffective at times.    She reports experiencing bilateral leg tightness, which she describes as similar to the sensation of skin stretching due to swelling after a flight. This symptom occurs nightly and is accompanied by varicose veins. She does not report any associated swelling. She has compression socks at home and also owns compression boots, which she used during her pregnancy. She recalls experiencing edema towards the end of her pregnancy. She does not consume excessive amounts of salt or processed foods, preferring to cook her own meals. She consumes one slice of cheese daily and drinks a significant amount of water. She occasionally consumes soda and red meat, approximately once every two weeks. She does not engage in regular physical activity.    FAMILY HISTORY  Her mother and father have a history of blood clots.    MEDICATIONS  Current: Tylenol    Health Maintenance: reviewed and completed per patient preference.     ROS:   PER HPI.           Social History     Socioeconomic History    Marital status:      Spouse name: Not on file    Number of children: Not on file    Years of education: Not on file    Highest education level: Not on file   Occupational History    Not on file   Tobacco Use    Smoking status: Never    Smokeless tobacco: Never   Vaping Use    Vaping status: Never Used   Substance and Sexual Activity    Alcohol use: Yes     Alcohol/week: 0.5 oz     Types: 1 Glasses of wine per week     Comment: 2 times a month glass of wine    Drug use: Never    Sexual activity: Yes     Partners: Male   Other Topics Concern    Not on file   Social History Narrative    Not on file     Social Drivers of Health     Financial Resource Strain: Not on file   Food Insecurity: Not on  "file   Transportation Needs: Not on file   Physical Activity: Not on file   Stress: Not on file   Social Connections: Not on file   Intimate Partner Violence: Not on file   Housing Stability: Not on file      No Known Allergies         Current Outpatient Medications:     SUMAtriptan, 25-50 mg, Oral, QDAY PRN, Taking As Needed    norethindrone-ethinyl estradiol, 1 Tablet, Oral, DAILY, Taking    hydrOXYzine HCl, 25 mg, Oral, TID PRN, Taking   Objective:     Exam: /70 (BP Location: Left arm, Patient Position: Sitting)   Pulse 85   Temp 36.3 °C (97.3 °F) (Temporal)   Resp 14   Ht 1.727 m (5' 8\")   Wt 104 kg (230 lb)   SpO2 97%  Body mass index is 34.97 kg/m².    Physical Exam:  Constitutional: Alert, no distress, well-groomed.  Skin: Warm, dry, good turgor, no rashes in visible areas.  Eye: Equal, round and reactive, conjunctiva clear, lids normal.  ENMT: Lips without lesions, good dentition, moist mucous membranes.  Neck: Trachea midline, no masses, no thyromegaly.  Respiratory: Unlabored respiratory effort, no cough.  Abd: soft, non tender, non distended, normal BS  MSK: Normal gait, moves all extremities. Trace LE edema  Neuro: Grossly non-focal.   Psych: Alert and oriented x3, normal affect and mood.    Labs: Reviewed    Assessment & Plan:   44 y.o. female with the following -    1. Blurred vision    2. Migraine with aura and without status migrainosus, not intractable  - SUMAtriptan (IMITREX) 25 MG Tab tablet; Take 1-2 Tablets by mouth 1 time a day as needed for Migraine for up to 30 days.  Dispense: 15 Tablet; Refill: 0    3. Asymptomatic varicose veins of both lower extremities    4. Prediabetes      Assessment & Plan  1. Blurred vision.  The symptoms suggest a potential diagnosis of migraines, which can manifest in various forms including blurred vision, light sensitivity, and noise sensitivity. The absence of unilateral symptoms reduces the likelihood of a cerebrovascular event. However, the " possibility of transient ischemic attacks (TIAs) can not be completely ruled out. A prescription for Imitrex 50 mg has been provided, with instructions to take 1 to 2 tablets as needed for migraines. She has been advised to maintain adequate hydration and to communicate any instances of headaches via MyChart. If the medication proves ineffective, further diagnostic investigations will be considered.  If it happens again she was instructed to go to the ER for further evaluation.    2. Headaches.  The patient reports intermittent headaches, which have improved since her last visit but still occur occasionally. She has been advised to use Imitrex 50 mg as needed for migraines. Adequate hydration and stress management were recommended to help prevent headaches.    3. Varicose veins.  The patient's symptoms of leg tightness and occasional swelling, which worsen at the end of the day and improve by morning, are indicative of varicose veins. These can be triggered by factors such as pregnancy, obesity, prolonged standing, and genetic predisposition. She has been advised to wear compression socks daily, removing them at night, and to use compression boots in the evening if time permits. She has also been encouraged to monitor her salt intake and ensure adequate hydration.    4. Prediabetes.  Her hemoglobin A1c level is 5.7%, indicating prediabetes. She has been counseled on the importance of diet and lifestyle modifications, including increased physical activity, reduced sugar intake, avoidance of processed foods, and portion control. She has been advised to focus on lean proteins and vegetables while limiting her consumption of pasta, rice, and potatoes.    Follow-up  The patient is scheduled for a follow-up visit in 3 months.        Return in about 3 months (around 4/7/2025) for Med Check.    Please note that this dictation was created using voice recognition software. I have made every reasonable attempt to correct  obvious errors, but I expect that there are errors of grammar and possibly content that I did not discover before finalizing the note.

## 2025-01-15 NOTE — PROGRESS NOTES
Urogynecology & Reconstructive Pelvic Surgery    Daria Fregoso MRN:[id number] :1980    Referred by: Catalina LO    Reason for Visit:   Chief Complaint   Patient presents with    New Patient     Consult          Subjective     History of Presenting Illness:    Daria Fregoso is a 44 y.o. P1 who presents for the evaluation and management of urinary incontinence.     Leakage is unclear, always needs pad since it is wet. When she tries to urinate, she has to void multiple times. She has discomfort with urgency and a full bladder, and this pain gets better with urination.      Her periods used to be heavy, and now rarely has a period, maybe some light spotting. Bleeding is irregular. She had a nexplanon, removed 3 months ago (10/2024), no periods since. She used to have dysmenorrhea, but not after pregnancy    She has also had white nipple discharge recently with breast firmness. She stopped breastfeeding 2 year ago. She has not been sexually active for many months, so no chance of pregnancy.     Prior Pelvic surgery:   CS  Appendectomy     Prior treatment:   None      Pelvic floor symptom review:     Bladder:   Voids per day: 10+ Voids per night: 1-2      Urinary incontinence episodes per day: varies, unclear    Urge leakage:  On Movement to Bathroom and Full Bladder   Stress leakage: None   Continuous / insensible urine loss: No    Nocturnal enuresis: No    Leakage volume: Drops   Number of pads/day: 1-2    Bladder emptying: Incomplete   Voiding symptoms: Double or Triple Voiding   UTI in last 12 months: none   Other urologic history: no      Prolapse:     Prolapse symptoms: None      Sexual function:    Sexually active: No         Pelvic Pain: no, just suprapubic discomfort      Past medical and surgical history    Past medical history:  Past Medical History:   Diagnosis Date    Anxiety 2024     Past surgical history:  Past Surgical History:   Procedure Laterality Date    PRIMARY C SECTION  "Bilateral 2021    Procedure:  SECTION, PRIMARY;  Surgeon: Pepe Torrez M.D.;  Location: [facility];  Service: Labor and Delivery    APPENDECTOMY      LIPOSUCTION      MAMMOPLASTY AUGMENTATION      NASAL RECONSTRUCTION       Medications:has a current medication list which includes the following prescription(s): multivitamin, estradiol, trospium chloride, sumatriptan, norethindrone-ethinyl estradiol, and hydroxyzine hcl.  Allergies:Patient has no known allergies.  Family history:  Family History   Problem Relation Age of Onset    Hypertension Mother     Hyperlipidemia Mother     Hypertension Father     Diabetes Father     Hyperlipidemia Father      Social history: reports that she has never smoked. She has never used smokeless tobacco. She reports current alcohol use of about 0.5 oz of alcohol per week. She reports that she does not use drugs.    Review of systems: A full review of systems was performed, and negative with the exception of want is noted above in the HPI.        Objective        /78 (BP Location: Right arm, Patient Position: Sitting, BP Cuff Size: Adult)   Pulse 64   Ht 5' 8\"   Wt 232 lb   BMI 35.28 kg/m²     Physical Exam  Vitals reviewed. Exam conducted with a chaperone present.   Constitutional:       Appearance: Normal appearance.   HENT:      Head: Normocephalic.      Mouth/Throat:      Mouth: Mucous membranes are moist.   Cardiovascular:      Rate and Rhythm: Normal rate.   Pulmonary:      Effort: Pulmonary effort is normal.   Skin:     General: Skin is warm and dry.   Neurological:      Mental Status: She is alert.   Psychiatric:         Mood and Affect: Mood normal.         Procedure Performed: No    Diagnostic test and records review:    Urine dipstick:  blood     Post-void residual: 100 mL, performed by Bladder Scanner    Labs:         Component  Ref Range & Units 5 mo ago   TSH  0.380 - 5.330 uIU/mL 1.350        Component  Ref Range & Units 5 mo " ago   Glycohemoglobin  4.0 - 5.6 % 5.7 High        Radiology:     Mammogram 2024  1.  Breasts are heterogeneously dense, which may obscure small masses. No gross evidence of malignancy.  2.  Screening mammogram in one year is recommended.    Procedural: n/a    Documentation reviewed: Prior EMR Records         Assessment & Plan     Daria Fregoso is a 44 y.o. P1 with bladder urgency and discomfort, as well as galactorrhea. We discussed my recommendations for further diagnosis and treatment at length today.     1. OAB (overactive bladder)  2. Bladder pain syndrome  3. Nocturia  OAB Symptoms.  It Was Difficult to Differentiate True Urgency/OAB Versus Bladder Pain, but Her Most Notable Symptom Is Significant Discomfort When She Has a Full Bladder Which Is Relieved with Urinating.  I recommend simultaneously moving down the OAB treatment pathway and possibly bladder pain pathway  - estradiol (ESTRACE VAGINAL) 0.1 MG/GM vaginal cream; Apply 1g cream inside vagina twice per week  Dispense: 1 Each; Refill: 6  - Trospium Chloride 60 MG CAPSULE SR 24 HR; Take 1 Capsule by mouth every day for 30 days.  Dispense: 30 Capsule; Refill: 0  - If no improvement, consider bladder instillation therapy, which was reviewed in detail with her today.    4. Galactorrhea  5. Amenorrhea, secondary  She has not had a return of regular periods since her pregnancy.  She did have a Nexplanon in place which could explain this for the last few years, but she is now 3 months after having it removed and has not had a normal period.  She also noticed milky discharge from her nipples with breast firmness.  She had a normal mammogram, but I recommend workup of galactorrhea and finding causes for her amenorrhea.  Differential diagnosis includes abnormal prolactin but also menopause.  I recommend lab work, and follow-up with appropriate provider as needed, and I counseled the patient that I do not routinely manage these conditions, so after workup  she may require referral.  - PROLACTIN; Future  - FSH/LH; Future  - ESTRADIOL; Future      Other orders  - multivitamin Tab; Take 1 Tablet by mouth every day.                 Kevin Herrera MD, FACOG  Urogynecology and Reconstructive Pelvic Surgery  Department of Obstetrics and Gynecology  [facility]        This medical record contains text that has been entered with the assistance of computer voice recognition and dictation software.  Therefore, it may contain unintended errors in text, spelling, punctuation, or grammar

## 2025-01-16 ENCOUNTER — GYNECOLOGY VISIT (OUTPATIENT)
Dept: GYNECOLOGY | Facility: CLINIC | Age: 45
End: 2025-01-16
Payer: MEDICAID

## 2025-01-16 VITALS
BODY MASS INDEX: 35.16 KG/M2 | HEIGHT: 68 IN | HEART RATE: 64 BPM | WEIGHT: 232 LBS | SYSTOLIC BLOOD PRESSURE: 131 MMHG | DIASTOLIC BLOOD PRESSURE: 78 MMHG

## 2025-01-16 DIAGNOSIS — R35.1 NOCTURIA: ICD-10-CM

## 2025-01-16 DIAGNOSIS — N64.3 GALACTORRHEA: ICD-10-CM

## 2025-01-16 DIAGNOSIS — R39.82 CHRONIC BLADDER PAIN: ICD-10-CM

## 2025-01-16 DIAGNOSIS — N32.81 OAB (OVERACTIVE BLADDER): Primary | ICD-10-CM

## 2025-01-16 DIAGNOSIS — N91.1 AMENORRHEA, SECONDARY: ICD-10-CM

## 2025-01-16 LAB
APPEARANCE UR: CLEAR
BILIRUB UR STRIP-MCNC: NEGATIVE MG/DL
COLOR UR AUTO: YELLOW
GLUCOSE UR STRIP.AUTO-MCNC: NEGATIVE MG/DL
KETONES UR STRIP.AUTO-MCNC: NEGATIVE MG/DL
LEUKOCYTE ESTERASE UR QL STRIP.AUTO: NEGATIVE
NITRITE UR QL STRIP.AUTO: NEGATIVE
PH UR STRIP.AUTO: 5.5 [PH] (ref 5–8)
PROT UR QL STRIP: NEGATIVE MG/DL
RBC UR QL AUTO: NORMAL
SP GR UR STRIP.AUTO: 1.02
UROBILINOGEN UR STRIP-MCNC: 0.2 MG/DL

## 2025-01-16 PROCEDURE — 81002 URINALYSIS NONAUTO W/O SCOPE: CPT | Performed by: STUDENT IN AN ORGANIZED HEALTH CARE EDUCATION/TRAINING PROGRAM

## 2025-01-16 PROCEDURE — 99204 OFFICE O/P NEW MOD 45 MIN: CPT | Performed by: STUDENT IN AN ORGANIZED HEALTH CARE EDUCATION/TRAINING PROGRAM

## 2025-01-16 RX ORDER — ESTRADIOL 0.1 MG/G
CREAM VAGINAL
Qty: 1 EACH | Refills: 6 | Status: SHIPPED | OUTPATIENT
Start: 2025-01-16 | End: 2025-01-17

## 2025-01-16 RX ORDER — TROSPIUM CHLORIDE ER 60 MG/1
60 CAPSULE ORAL DAILY
Qty: 30 CAPSULE | Refills: 0 | Status: SHIPPED | OUTPATIENT
Start: 2025-01-16 | End: 2025-02-15

## 2025-01-16 ASSESSMENT — FIBROSIS 4 INDEX: FIB4 SCORE: 0.45

## 2025-01-16 NOTE — PROGRESS NOTES
PT here today for consult   Ref for urinary incontinence   Hysterectomy? X  Good #: [phone]   PVR :  100 mL   Pharmacy Verified

## 2025-01-17 ENCOUNTER — TELEPHONE (OUTPATIENT)
Dept: OBGYN | Facility: CLINIC | Age: 45
End: 2025-01-17
Payer: MEDICAID

## 2025-01-17 RX ORDER — ESTRADIOL 10 UG/1
INSERT VAGINAL
Qty: 12 TABLET | Refills: 6 | Status: SHIPPED | OUTPATIENT
Start: 2025-01-17

## 2025-02-06 ENCOUNTER — OFFICE VISIT (OUTPATIENT)
Dept: URGENT CARE | Facility: PHYSICIAN GROUP | Age: 45
End: 2025-02-06
Payer: MEDICAID

## 2025-02-06 VITALS
SYSTOLIC BLOOD PRESSURE: 110 MMHG | DIASTOLIC BLOOD PRESSURE: 76 MMHG | HEIGHT: 68 IN | TEMPERATURE: 98.2 F | BODY MASS INDEX: 35.04 KG/M2 | HEART RATE: 102 BPM | RESPIRATION RATE: 16 BRPM | WEIGHT: 231.2 LBS | OXYGEN SATURATION: 96 %

## 2025-02-06 DIAGNOSIS — J10.1 INFLUENZA A: ICD-10-CM

## 2025-02-06 LAB
FLUAV RNA SPEC QL NAA+PROBE: POSITIVE
FLUBV RNA SPEC QL NAA+PROBE: NEGATIVE
RSV RNA SPEC QL NAA+PROBE: NEGATIVE
S PYO DNA SPEC NAA+PROBE: NOT DETECTED
SARS-COV-2 RNA RESP QL NAA+PROBE: NEGATIVE

## 2025-02-06 PROCEDURE — 87637 SARSCOV2&INF A&B&RSV AMP PRB: CPT | Mod: QW

## 2025-02-06 PROCEDURE — 3074F SYST BP LT 130 MM HG: CPT

## 2025-02-06 PROCEDURE — 99213 OFFICE O/P EST LOW 20 MIN: CPT

## 2025-02-06 PROCEDURE — 3078F DIAST BP <80 MM HG: CPT

## 2025-02-06 PROCEDURE — 87651 STREP A DNA AMP PROBE: CPT

## 2025-02-06 RX ORDER — OSELTAMIVIR PHOSPHATE 75 MG/1
75 CAPSULE ORAL 2 TIMES DAILY
Qty: 10 CAPSULE | Refills: 0 | Status: SHIPPED | OUTPATIENT
Start: 2025-02-06

## 2025-02-06 RX ORDER — DEXTROMETHORPHAN HYDROBROMIDE AND PROMETHAZINE HYDROCHLORIDE 15; 6.25 MG/5ML; MG/5ML
5 SYRUP ORAL NIGHTLY PRN
Qty: 118 ML | Refills: 0 | Status: SHIPPED | OUTPATIENT
Start: 2025-02-06

## 2025-02-06 RX ORDER — PREDNISONE 20 MG/1
40 TABLET ORAL DAILY
Qty: 10 TABLET | Refills: 0 | Status: SHIPPED | OUTPATIENT
Start: 2025-02-06 | End: 2025-02-11

## 2025-02-06 RX ORDER — ALBUTEROL SULFATE 90 UG/1
2 INHALANT RESPIRATORY (INHALATION) EVERY 6 HOURS PRN
Qty: 8.5 G | Refills: 0 | Status: SHIPPED | OUTPATIENT
Start: 2025-02-06

## 2025-02-06 ASSESSMENT — ENCOUNTER SYMPTOMS
CHILLS: 1
DIARRHEA: 0
FEVER: 0
MYALGIAS: 1
SORE THROAT: 1
NAUSEA: 0
HEADACHES: 0
SHORTNESS OF BREATH: 1
COUGH: 1
ABDOMINAL PAIN: 0
VOMITING: 0

## 2025-02-06 ASSESSMENT — FIBROSIS 4 INDEX: FIB4 SCORE: 0.45

## 2025-02-06 NOTE — PROGRESS NOTES
"Subjective:   Daria Fregoso is a 44 y.o. female who presents for Pharyngitis (Sore throat, fever, white spots in throat, body aches, nausea, cough, chest and back pain, SOB, wheezing X 2 days )      Pharyngitis   This is a new problem. The current episode started in the past 7 days. The problem has been gradually worsening. Associated symptoms include congestion, coughing and shortness of breath. Pertinent negatives include no abdominal pain, diarrhea, ear pain, headaches or vomiting. She has had exposure to strep. She has had no exposure to mono. Treatments tried: Tylenol/Cough medicine. The treatment provided mild relief.       Review of Systems   Constitutional:  Positive for chills and malaise/fatigue. Negative for fever.   HENT:  Positive for congestion and sore throat. Negative for ear pain and hearing loss.    Respiratory:  Positive for cough and shortness of breath.    Cardiovascular:  Negative for chest pain.   Gastrointestinal:  Negative for abdominal pain, diarrhea, nausea and vomiting.   Genitourinary:  Negative for dysuria.   Musculoskeletal:  Positive for myalgias.   Skin:  Negative for rash.   Neurological:  Negative for headaches.       Medications, Allergies, and current problem list reviewed today in Epic.     Objective:     /76 (BP Location: Left arm, Patient Position: Standing, BP Cuff Size: Adult)   Pulse (!) 102   Temp 36.8 °C (98.2 °F) (Temporal)   Resp 16   Ht 1.727 m (5' 8\")   Wt 105 kg (231 lb 3.2 oz)   SpO2 96%     Physical Exam  Vitals and nursing note reviewed.   Constitutional:       Appearance: Normal appearance.   HENT:      Head: Normocephalic and atraumatic.      Right Ear: Tympanic membrane normal.      Left Ear: Tympanic membrane normal.      Nose: Congestion present. No rhinorrhea.      Mouth/Throat:      Mouth: Mucous membranes are moist.      Pharynx: Posterior oropharyngeal erythema present. No oropharyngeal exudate.   Eyes:      Conjunctiva/sclera: " Conjunctivae normal.   Cardiovascular:      Rate and Rhythm: Normal rate.      Heart sounds: Normal heart sounds.   Pulmonary:      Effort: Pulmonary effort is normal. No respiratory distress.      Breath sounds: No stridor. No wheezing or rhonchi.   Abdominal:      General: Abdomen is flat.      Palpations: Abdomen is soft.      Tenderness: There is no abdominal tenderness. There is no guarding.   Musculoskeletal:         General: Normal range of motion.      Cervical back: Normal range of motion.   Skin:     General: Skin is warm and dry.      Capillary Refill: Capillary refill takes less than 2 seconds.   Neurological:      Mental Status: She is alert and oriented to person, place, and time.   Psychiatric:         Mood and Affect: Mood normal.         Behavior: Behavior normal.       Results for orders placed or performed in visit on 02/06/25   POCT Cepheid Group A Strep - PCR    Collection Time: 02/06/25 10:34 AM   Result Value Ref Range    POC Group A Strep, PCR Not Detected Not Detected, Invalid   POCT CoV-2, Flu A/B, RSV by PCR    Collection Time: 02/06/25 11:13 AM   Result Value Ref Range    SARS-CoV-2 by PCR Negative Negative, Invalid    Influenza virus A RNA Positive (A) Negative, Invalid    Influenza virus B, PCR Negative Negative, Invalid    RSV, PCR Negative Negative, Invalid       Assessment/Plan:       1. Influenza A  POCT Cepheid Group A Strep - PCR    POCT CoV-2, Flu A/B, RSV by PCR    promethazine-dextromethorphan (PROMETHAZINE-DM) 6.25-15 MG/5ML syrup    predniSONE (DELTASONE) 20 MG Tab    albuterol 108 (90 Base) MCG/ACT Aero Soln inhalation aerosol    oseltamivir (TAMIFLU) 75 MG Cap        After assessment patient symptoms do appear viral in nature.  A viral and strep swab were performed and patient did test positive at this time for influenza A.  Patient within window of treatment and at this time was provided prescription for Tamiflu.  Prescriptions for promethazine cough syrup, prednisone, and  albuterol were also sent to patient pharmacy.  Patient instructed take everything as prescribed.  Recommend adequate hydration, rest, deep breathing and coughing, ambulation as tolerated, OTC medications.  Patient instructed to monitor for any worsening signs and symptoms of any other concerns patient was instructed return to urgent care for reevaluation.    Differential diagnosis, natural history, and supportive care discussed. We also reviewed side effects of medication including allergic response, GI upset, tendon injury, rash, sedation etc. Patient and/or guardian voices understanding.      Advised the patient to follow-up with the primary care physician for recheck, reevaluation, and consideration of further management.    I personally reviewed prior external notes and test results pertinent to today's visit as well as additional imaging and testing completed in clinic today.     Please note that this dictation was created using voice recognition software. I have made every reasonable attempt to correct obvious errors, but I expect that there are errors of grammar and possibly content that I did not discover before finalizing the note.    This note was electronically signed by JOSE MANUEL Harris

## 2025-02-21 ENCOUNTER — GYNECOLOGY VISIT (OUTPATIENT)
Dept: GYNECOLOGY | Facility: CLINIC | Age: 45
End: 2025-02-21
Payer: MEDICAID

## 2025-02-21 ENCOUNTER — HOSPITAL ENCOUNTER (OUTPATIENT)
Dept: LAB | Facility: MEDICAL CENTER | Age: 45
End: 2025-02-21
Attending: STUDENT IN AN ORGANIZED HEALTH CARE EDUCATION/TRAINING PROGRAM
Payer: MEDICAID

## 2025-02-21 VITALS — HEART RATE: 88 BPM | SYSTOLIC BLOOD PRESSURE: 123 MMHG | DIASTOLIC BLOOD PRESSURE: 72 MMHG

## 2025-02-21 DIAGNOSIS — N64.3 GALACTORRHEA: ICD-10-CM

## 2025-02-21 DIAGNOSIS — N91.1 AMENORRHEA, SECONDARY: ICD-10-CM

## 2025-02-21 DIAGNOSIS — N32.81 OAB (OVERACTIVE BLADDER): ICD-10-CM

## 2025-02-21 DIAGNOSIS — R35.1 NOCTURIA: ICD-10-CM

## 2025-02-21 DIAGNOSIS — R39.82 CHRONIC BLADDER PAIN: ICD-10-CM

## 2025-02-21 LAB
ESTRADIOL SERPL-MCNC: 106 PG/ML
FSH SERPL-ACNC: 14.2 MIU/ML
LH SERPL-ACNC: 19.6 IU/L
PROLACTIN SERPL-MCNC: 18.7 NG/ML (ref 2.8–26)

## 2025-02-21 PROCEDURE — 84146 ASSAY OF PROLACTIN: CPT

## 2025-02-21 PROCEDURE — 83001 ASSAY OF GONADOTROPIN (FSH): CPT

## 2025-02-21 PROCEDURE — 36415 COLL VENOUS BLD VENIPUNCTURE: CPT

## 2025-02-21 PROCEDURE — 83002 ASSAY OF GONADOTROPIN (LH): CPT

## 2025-02-21 PROCEDURE — 82670 ASSAY OF TOTAL ESTRADIOL: CPT

## 2025-02-21 RX ORDER — DARIFENACIN 7.5 MG/1
7.5 TABLET, EXTENDED RELEASE ORAL DAILY
Qty: 30 TABLET | Refills: 0 | Status: SHIPPED | OUTPATIENT
Start: 2025-02-21 | End: 2025-03-23

## 2025-02-21 NOTE — PROGRESS NOTES
Urogynecology & Reconstructive Pelvic Surgery    Daria Fregoso MRN:[id number] :1980    Referred by: Catalina LO    Reason for Visit:   Chief Complaint   Patient presents with    Follow-Up         Subjective     History of Presenting Illness:    Daria Fregoso is a 44 y.o. P1 who with mixed urinary incontinence and galactorrhea who presents for follow-up    She was started on trospium as a trial for overactive bladder, and also started on vaginal estrogen. Trospium hasn't helped  much with her symptoms after taking for 3 weeks    She is also ordered for labs to workup her galactorrhea, forgot to have her labs performed      Initial HPI: She presents for the evaluation and management of urinary incontinence.     Leakage is unclear, always needs pad since it is wet. When she tries to urinate, she has to void multiple times. She has discomfort with urgency and a full bladder, and this pain gets better with urination.      Her periods used to be heavy, and now rarely has a period, maybe some light spotting. Bleeding is irregular. She had a nexplanon, removed 3 months ago (10/2024), no periods since. She used to have dysmenorrhea, but not after pregnancy    She has also had white nipple discharge recently with breast firmness. She stopped breastfeeding 2 year ago. She has not been sexually active for many months, so no chance of pregnancy.     Prior Pelvic surgery:   CS  Appendectomy     Prior treatment:   Trospium- minimal improvement      Pelvic floor symptom review:     Bladder:   Voids per day: 10+ Voids per night: 1-2      Urinary incontinence episodes per day: varies, unclear    Urge leakage:  On Movement to Bathroom and Full Bladder   Stress leakage: None   Continuous / insensible urine loss: No    Nocturnal enuresis: No    Leakage volume: Drops   Number of pads/day: 1-2    Bladder emptying: Incomplete   Voiding symptoms: Double or Triple Voiding   UTI in last 12 months: none   Other urologic  history: no      Prolapse:     Prolapse symptoms: None      Sexual function:    Sexually active: No         Pelvic Pain: no, just suprapubic discomfort      Past medical and surgical history    Past medical history:  Past Medical History:   Diagnosis Date    Anxiety 2024     Past surgical history:  Past Surgical History:   Procedure Laterality Date    PRIMARY C SECTION Bilateral 2021    Procedure:  SECTION, PRIMARY;  Surgeon: Pepe Torrez M.D.;  Location: SUR[facility]ERY LAB[facility] DELIV[facility];  Service: Labor and Delivery    APPENDECTOMY      LIPOSUCTION      MAMMOPLASTY AUGMENTATION      NASAL RECONSTRUCTION       Medications:has a current medication list which includes the following prescription(s): darifenacin, promethazine-dextromethorphan, albuterol, oseltamivir, estradiol, multivitamin, norethindrone-ethinyl estradiol, and hydroxyzine hcl.  Allergies:Patient has no known allergies.  Family history:  Family History   Problem Relation Age of Onset    Hypertension Mother     Hyperlipidemia Mother     Hypertension Father     Diabetes Father     Hyperlipidemia Father      Social history: reports that she has never smoked. She has never used smokeless tobacco. She reports current alcohol use of about 0.5 oz of alcohol per week. She reports that she does not use drugs.    Review of systems: A full review of systems was performed, and negative with the exception of want is noted above in the HPI.        Objective        /72 (BP Location: Left arm, Patient Position: Sitting, BP Cuff Size: Large adult)   Pulse 88     Physical Exam  Vitals reviewed. Exam conducted with a chaperone present.   Constitutional:       Appearance: Normal appearance.   HENT:      Head: Normocephalic.      Mouth/Throat:      Mouth: Mucous membranes are moist.   Cardiovascular:      Rate and Rhythm: Normal rate.   Pulmonary:      Effort: Pulmonary effort is normal.   Skin:     General: Skin is warm and dry.   Neurological:       Mental Status: She is alert.   Psychiatric:         Mood and Affect: Mood normal.         Procedure Performed: No    Diagnostic test and records review:    Urine dipstick: sm blood     Post-void residual: 100 mL, performed by Bladder Scanner    Labs:         Component  Ref Range & Units 5 mo ago   TSH  0.380 - 5.330 uIU/mL 1.350        Component  Ref Range & Units 5 mo ago   Glycohemoglobin  4.0 - 5.6 % 5.7 High        Radiology:     Mammogram 2024  1.  Breasts are heterogeneously dense, which may obscure small masses. No gross evidence of malignancy.  2.  Screening mammogram in one year is recommended.    Procedural: n/a    Documentation reviewed: Prior EMR Records         Assessment & Plan     Daria Fregoso is a 44 y.o. P1 with bladder urgency and discomfort, as well as galactorrhea. We discussed my recommendations for further diagnosis and treatment at length today.     1. OAB (overactive bladder)  2. Bladder pain syndrome  3. Nocturia  OAB Symptoms.  It Was Difficult to Differentiate True Urgency/OAB Versus Bladder Pain, but Her Most Notable Symptom Is Significant Discomfort When She Has a Full Bladder Which Is Relieved with Urinating.  I recommend simultaneously moving down the OAB treatment pathway and possibly bladder pain pathway  -Continue vaginal estrogen tablets, she is aware this may take time to fully help  -Trospium did not help her symptoms, she can discontinue this and I sent a prescription for darifenacin.  She may send a message with follow-up and if this works or not after 3 to 4 weeks  - If no improvement, consider bladder instillation therapy, which was reviewed in detail with her today.    4. Galactorrhea  5. Amenorrhea, secondary  She has not had a return of regular periods since her pregnancy.  She did have a Nexplanon in place which could explain this for the last few years, but she is now 3 months after having it removed and has not had a normal period.  She also noticed milky  discharge from her nipples with breast firmness.  She had a normal mammogram, but I recommend workup of galactorrhea and finding causes for her amenorrhea.  Differential diagnosis includes abnormal prolactin but also menopause.  I recommend lab work, and follow-up with appropriate provider as needed, and I counseled the patient that I do not routinely manage these conditions, so after workup she may require referral.  - PROLACTIN; Future  - FSH/LH; Future  - ESTRADIOL; Future  - Patient forgot to have labs drawn prior to this visit, she will go to the lab and have these drawn soon for review      Other orders  - multivitamin Tab; Take 1 Tablet by mouth every day.                 Kevin Herrera MD, FACOG  Urogynecology and Reconstructive Pelvic Surgery  Department of Obstetrics and Gynecology  [facility]        This medical record contains text that has been entered with the assistance of computer voice recognition and dictation software.  Therefore, it may contain unintended errors in text, spelling, punctuation, or grammar

## 2025-02-21 NOTE — PATIENT INSTRUCTIONS
Preferred [place] [facility] therapists:     [facility]  Julianne Gillis DPT  [address], [address]  [place], [place], [address]  [email or web address]  [facility]:   Sara Sanofrd DPT and Adriana Guerrero DPT  [address] [address]  [phone]  [email or web address]  [facility] DPT   [address]    Phone: [phone]   [email or web address]   [facility]  Madalyn Mckeon - DPT  [facility]   [address]  [place]. [address]   Phone: [phone]   Fax: [phone]   [facility]:  Bindu Scales DPT  [address] [place] [address]  Phone: [phone]  [email or web address]

## 2025-02-24 ENCOUNTER — RESULTS FOLLOW-UP (OUTPATIENT)
Dept: OBGYN | Facility: CLINIC | Age: 45
End: 2025-02-24

## 2025-02-24 NOTE — Clinical Note
REFERRAL APPROVAL NOTICE         Sent on February 24, 2025                   Daria Fregoso  [address] [place] [address]                   Dear Ms. Fregoso,    After a careful review of the medical information and benefit coverage, [facility] has processed your referral. See below for additional details.    If applicable, you must be actively enrolled with your insurance for coverage of the authorized service. If you have any questions regarding your coverage, please contact your insurance directly.    REFERRAL INFORMATION   Referral #:  [id number]  Referred-To Provider    Referred-By Provider:  Physical Therapy    Kevin Herrera M.D.   [facility]      [address]  [facility] [address]  [phone] [address]  [place] [address]  [phone]    Referral Start Date:  02/21/2025  Referral End Date:   02/21/2026             SCHEDULING  If you do not already have an appointment, please call [phone] to make an appointment.     MORE INFORMATION  If you do not already have a [employer] account, sign up at: [email or web address]  You can access your medical information, make appointments, see lab results, billing information, and more.  If you have questions regarding this referral, please contact  the [facility] Referrals department at:             [phone]. Monday - Friday 8:00AM - 5:00PM.     Sincerely,    [facility]

## 2025-04-02 ENCOUNTER — OFFICE VISIT (OUTPATIENT)
Dept: URGENT CARE | Facility: PHYSICIAN GROUP | Age: 45
End: 2025-04-02
Payer: MEDICAID

## 2025-04-02 VITALS
BODY MASS INDEX: 35.75 KG/M2 | WEIGHT: 235.89 LBS | SYSTOLIC BLOOD PRESSURE: 116 MMHG | TEMPERATURE: 97.4 F | RESPIRATION RATE: 14 BRPM | OXYGEN SATURATION: 96 % | HEART RATE: 73 BPM | HEIGHT: 68 IN | DIASTOLIC BLOOD PRESSURE: 72 MMHG

## 2025-04-02 DIAGNOSIS — H10.33 ACUTE CONJUNCTIVITIS OF BOTH EYES, UNSPECIFIED ACUTE CONJUNCTIVITIS TYPE: ICD-10-CM

## 2025-04-02 DIAGNOSIS — J00 ACUTE RHINITIS: ICD-10-CM

## 2025-04-02 PROCEDURE — 3078F DIAST BP <80 MM HG: CPT | Performed by: PHYSICIAN ASSISTANT

## 2025-04-02 PROCEDURE — 3074F SYST BP LT 130 MM HG: CPT | Performed by: PHYSICIAN ASSISTANT

## 2025-04-02 PROCEDURE — 99213 OFFICE O/P EST LOW 20 MIN: CPT | Performed by: PHYSICIAN ASSISTANT

## 2025-04-02 RX ORDER — OFLOXACIN 3 MG/ML
1 SOLUTION/ DROPS OPHTHALMIC 4 TIMES DAILY
Qty: 10 ML | Refills: 0 | Status: SHIPPED | OUTPATIENT
Start: 2025-04-02 | End: 2025-04-09

## 2025-04-02 ASSESSMENT — ENCOUNTER SYMPTOMS
EYE DISCHARGE: 0
EYE REDNESS: 1
EYE PAIN: 0
MYALGIAS: 0
SORE THROAT: 1
HEADACHES: 0
PHOTOPHOBIA: 0
DOUBLE VISION: 0
DIZZINESS: 0
FEVER: 0
CHILLS: 0
BLURRED VISION: 0
COUGH: 0

## 2025-04-02 ASSESSMENT — FIBROSIS 4 INDEX: FIB4 SCORE: 0.45

## 2025-04-02 NOTE — PROGRESS NOTES
Subjective:     CHIEF COMPLAINT  Chief Complaint   Patient presents with    Conjunctivitis     Pt was traveling and in the airport she was stressed and cried, her eyes b/l got very red but she thought it was from crying but has been having b/l red eyes and swelling since x2 days. Has been having mild drainage and mild itching. No pain.        HPI  Daria Fregoso is a very pleasant 44 y.o. female who presents to the clinic with bilateral eye redness x 2 days.  Originally noted red eyes after traveling.  Originally thought it was secondary to crying as the airlines lost her luggage.  She does however describe associated sinus congestion, runny nose and a dry scratchy throat.  Her son is experiencing similar symptoms.  She has not noted any significant discharge or drainage from the eyes.  No photophobia or visual change.  No contact lens use.  Denies any fevers, chills or myalgias.    REVIEW OF SYSTEMS  Review of Systems   Constitutional:  Negative for chills, fever and malaise/fatigue.   HENT:  Positive for congestion and sore throat (scratchy). Negative for ear pain.    Eyes:  Positive for redness. Negative for blurred vision, double vision, photophobia, pain and discharge.   Respiratory:  Negative for cough.    Musculoskeletal:  Negative for myalgias.   Skin:  Negative for rash.   Neurological:  Negative for dizziness and headaches.       PAST MEDICAL HISTORY  Patient Active Problem List    Diagnosis Date Noted    OAB (overactive bladder) 01/16/2025    Bladder pain syndrome 01/16/2025    Nocturia 01/16/2025    Galactorrhea 01/16/2025    Amenorrhea, secondary 01/16/2025    Blurred vision 01/07/2025    Asymptomatic varicose veins of both lower extremities 01/07/2025    Prediabetes 01/07/2025    Anxiety 08/06/2024    Arthralgia of right elbow 01/31/2020    Hyperlipidemia, unspecified 01/31/2020    Arthralgia of hand 10/04/2019       SURGICAL HISTORY   has a past surgical history that includes liposuction; nasal  "reconstruction; mammoplasty augmentation; appendectomy; and primary c section (Bilateral, 07/13/2021).    ALLERGIES  No Known Allergies    CURRENT MEDICATIONS  Home Medications       Reviewed by Perry Johnson P.A.-C. (Physician Assistant) on 04/02/25 at 1041  Med List Status: <None>     Medication Last Dose Status   albuterol 108 (90 Base) MCG/ACT Aero Soln inhalation aerosol Not Taking Flagged for Removal   estradiol (YUVAFEM) 10 MCG Tab Taking Active   hydrOXYzine HCl (ATARAX) 25 MG Tab Not Taking Flagged for Removal   multivitamin Tab Taking Active   norethindrone-ethinyl estradiol (JUNEL 1/20) 1-20 MG-MCG per tablet Not Taking Flagged for Removal   oseltamivir (TAMIFLU) 75 MG Cap Not Taking Flagged for Removal   promethazine-dextromethorphan (PROMETHAZINE-DM) 6.25-15 MG/5ML syrup Not Taking Flagged for Removal                    SOCIAL HISTORY  Social History     Tobacco Use    Smoking status: Never    Smokeless tobacco: Never   Vaping Use    Vaping status: Never Used   Substance and Sexual Activity    Alcohol use: Yes     Alcohol/week: 0.5 oz     Types: 1 Glasses of wine per week     Comment: 2 times a month glass of wine    Drug use: Never    Sexual activity: Not Currently     Partners: Male       FAMILY HISTORY  Family History   Problem Relation Age of Onset    Hypertension Mother     Hyperlipidemia Mother     Hypertension Father     Diabetes Father     Hyperlipidemia Father           Objective:     VITAL SIGNS: /72 (BP Location: Right arm, Patient Position: Sitting, BP Cuff Size: Adult long)   Pulse 73   Temp 36.3 °C (97.4 °F) (Temporal)   Resp 14   Ht 1.727 m (5' 8\") Comment: Pt reported  Wt 107 kg (235 lb 14.3 oz)   SpO2 96%   BMI 35.87 kg/m²     PHYSICAL EXAM  Physical Exam  Constitutional:       General: She is not in acute distress.     Appearance: Normal appearance. She is not ill-appearing, toxic-appearing or diaphoretic.   HENT:      Head: Normocephalic and atraumatic.      Right Ear: " Tympanic membrane, ear canal and external ear normal.      Left Ear: Tympanic membrane, ear canal and external ear normal.      Nose: Congestion and rhinorrhea present.      Mouth/Throat:      Mouth: Mucous membranes are moist.      Pharynx: No oropharyngeal exudate or posterior oropharyngeal erythema.   Eyes:      General:         Right eye: No discharge.         Left eye: No discharge.      Extraocular Movements: Extraocular movements intact.      Pupils: Pupils are equal, round, and reactive to light.      Comments: Bilateral conjunctiva injected.  No purulent discharge or drainage present.  No periorbital swelling, redness or tenderness.  EOMs full intact and pain-free.  PERRLA.   Cardiovascular:      Rate and Rhythm: Normal rate and regular rhythm.      Pulses: Normal pulses.      Heart sounds: Normal heart sounds.   Pulmonary:      Effort: Pulmonary effort is normal.      Breath sounds: Normal breath sounds. No wheezing, rhonchi or rales.   Musculoskeletal:      Cervical back: Normal range of motion. No muscular tenderness.   Lymphadenopathy:      Cervical: No cervical adenopathy.   Skin:     General: Skin is warm and dry.      Capillary Refill: Capillary refill takes less than 2 seconds.   Neurological:      Mental Status: She is alert.   Psychiatric:         Mood and Affect: Mood normal.         Thought Content: Thought content normal.         Assessment/Plan:     1. Acute rhinitis    2. Acute conjunctivitis of both eyes, unspecified acute conjunctivitis type  - ofloxacin (OCUFLOX) 0.3 % Solution; Administer 1 Drop into both eyes 4 times a day for 7 days.  Dispense: 10 mL; Refill: 0      MDM/Comments:    Pleasant and well-appearing 44-year-old female presents to the clinic with bilateral eye redness, nasal congestion and a scratchy throat x 2 days.  Discussed that I believe this is likely viral in etiology.  Discussed possible adenovirus.  Contingent course of antibiotic eyedrops provided though I do not  believe necessary at this time.  OTC supportive recommendations were discussed.    Differential diagnosis, natural history, supportive care, and indications for immediate follow-up discussed. All questions answered. Patient agrees with the plan of care.    Follow-up as needed if symptoms worsen or fail to improve to PCP, Urgent care or Emergency Room.    I have personally reviewed prior external notes and test results pertinent to today's visit.  I have independently reviewed and interpreted all diagnostics ordered during this urgent care acute visit.   Discussed management options (risks,benefits, and alternatives to treatment). Pt expresses understanding and the treatment plan was agreed upon. Questions were encouraged and answered to pt's satisfaction.    Please note that this dictation was created using voice recognition software. I have made a reasonable attempt to correct obvious errors, but I expect that there are errors of grammar and possibly content that I did not discover before finalizing the note.

## (undated) DEVICE — SET EXTENSION WITH 2 PORTS (48EA/CA) ***PART #2C8610 IS A SUBSTITUTE*****

## (undated) DEVICE — DRESSING NON-ADHERING 8 X 3 - (50/BX)

## (undated) DEVICE — PACK C-SECTION (2EA/CA)

## (undated) DEVICE — TRAY SPINAL ANESTHESIA NON-SAFETY (10/CA)

## (undated) DEVICE — SUTURE 0 VICRYL PLUS CT-1 - 36 INCH (36/BX)

## (undated) DEVICE — STAPLER SKIN DISP - (6/BX 10BX/CA) VISISTAT

## (undated) DEVICE — PACK ROOM TURNOVER L&D (12/CA)

## (undated) DEVICE — TUBING CLEARLINK DUO-VENT - C-FLO (48EA/CA)

## (undated) DEVICE — SODIUM CHL IRRIGATION 0.9% 1000ML (12EA/CA)

## (undated) DEVICE — KIT  I.V. START (100EA/CA)

## (undated) DEVICE — BAG SPONGE COUNT 10.25 X 32 - BLUE (250/CA)

## (undated) DEVICE — GLOVE BIOGEL SZ 7 SURGICAL PF LTX - (50PR/BX 4BX/CA)

## (undated) DEVICE — TAPE CLOTH MEDIPORE 6 INCH - (12RL/CA)

## (undated) DEVICE — GLOVE, BIOGEL ECLIPSE, SZ 7.0, PF LTX (50/BX)

## (undated) DEVICE — WATER IRRIGATION STERILE 1000ML (12EA/CA)

## (undated) DEVICE — SPONGE GAUZESTER 4 X 4 4PLY - (128PK/CA)

## (undated) DEVICE — ELECTRODE DUAL RETURN W/ CORD - (50/PK)

## (undated) DEVICE — PENCIL ELECTSURG 10FT HLSTR - WITH BLADE (50EA/CA)

## (undated) DEVICE — SOLUTION PLASMA-LYTE PH 7.4 INJ 1000ML  (14EA/CA)

## (undated) DEVICE — CATHETER IV NON-SAFETY 18 GA X 1 1/4 (50/BX 4BX/CA)

## (undated) DEVICE — HEAD HOLDER JUNIOR/ADULT

## (undated) DEVICE — PAD LAP STERILE 18 X 18 - (5/PK 40PK/CA)